# Patient Record
Sex: FEMALE
[De-identification: names, ages, dates, MRNs, and addresses within clinical notes are randomized per-mention and may not be internally consistent; named-entity substitution may affect disease eponyms.]

---

## 2018-05-08 ENCOUNTER — HOSPITAL ENCOUNTER (EMERGENCY)
Dept: HOSPITAL 97 - ER | Age: 23
Discharge: HOME | End: 2018-05-08
Payer: SELF-PAY

## 2018-05-08 VITALS — DIASTOLIC BLOOD PRESSURE: 64 MMHG | SYSTOLIC BLOOD PRESSURE: 105 MMHG

## 2018-05-08 VITALS — OXYGEN SATURATION: 99 %

## 2018-05-08 VITALS — TEMPERATURE: 98.5 F

## 2018-05-08 DIAGNOSIS — O03.4: Primary | ICD-10-CM

## 2018-05-08 DIAGNOSIS — O23.11: ICD-10-CM

## 2018-05-08 LAB
ALBUMIN SERPL BCP-MCNC: 4.3 G/DL (ref 3.2–5.5)
ALP SERPL-CCNC: 43 IU/L (ref 42–121)
ALT SERPL W P-5'-P-CCNC: 11 IU/L (ref 10–60)
AST SERPL W P-5'-P-CCNC: 14 IU/L (ref 10–42)
BUN BLD-MCNC: 12 MG/DL (ref 6–20)
GLUCOSE SERPLBLD-MCNC: 107 MG/DL (ref 65–120)
HCT VFR BLD CALC: 38.9 % (ref 36–45)
LIPASE SERPL-CCNC: 29 U/L (ref 22–51)
LYMPHOCYTES # SPEC AUTO: 1.5 K/UL (ref 0.7–4.9)
MCH RBC QN AUTO: 29.4 PG (ref 27–35)
MCV RBC: 87.7 FL (ref 80–100)
PMV BLD: 8.3 FL (ref 7.6–11.3)
POTASSIUM SERPL-SCNC: 3.5 MEQ/L (ref 3.6–5)
RBC # BLD: 4.43 M/UL (ref 3.86–4.86)
UA COMPLETE W REFLEX CULTURE PNL UR: (no result)

## 2018-05-08 PROCEDURE — 83690 ASSAY OF LIPASE: CPT

## 2018-05-08 PROCEDURE — 81003 URINALYSIS AUTO W/O SCOPE: CPT

## 2018-05-08 PROCEDURE — 81015 MICROSCOPIC EXAM OF URINE: CPT

## 2018-05-08 PROCEDURE — 80048 BASIC METABOLIC PNL TOTAL CA: CPT

## 2018-05-08 PROCEDURE — 87088 URINE BACTERIA CULTURE: CPT

## 2018-05-08 PROCEDURE — 85025 COMPLETE CBC W/AUTO DIFF WBC: CPT

## 2018-05-08 PROCEDURE — 99284 EMERGENCY DEPT VISIT MOD MDM: CPT

## 2018-05-08 PROCEDURE — 81025 URINE PREGNANCY TEST: CPT

## 2018-05-08 PROCEDURE — 36415 COLL VENOUS BLD VENIPUNCTURE: CPT

## 2018-05-08 PROCEDURE — 87086 URINE CULTURE/COLONY COUNT: CPT

## 2018-05-08 PROCEDURE — 87077 CULTURE AEROBIC IDENTIFY: CPT

## 2018-05-08 PROCEDURE — 80076 HEPATIC FUNCTION PANEL: CPT

## 2018-05-08 PROCEDURE — 87186 SC STD MICRODIL/AGAR DIL: CPT

## 2018-05-08 PROCEDURE — 76801 OB US < 14 WKS SINGLE FETUS: CPT

## 2018-05-08 NOTE — EDPHYS
Physician Documentation                                                                           

 [facility]                                                                

Name: Urvashi Witt                                                                                  

Age: 22 yrs                                                                                       

Sex: Female                                                                                       

: 1995                                                                                   

MRN: [id number]                                                                                   

Arrival Date: 2018                                                                          

Time: 14:33                                                                                       

Account#: [id number]                                                                            

Bed 13                                                                                            

Private MD:                                                                                       

ED Physician Frankie Nova                                                                       

HPI:                                                                                              

                                                                                             

19:18 This 22 yrs old  Female presents to ER via Ambulatory with complaints of        gs  

      Abdominal Pain.                                                                             

19:18 The patient presents with pelvic pain, that is located in/on the right lower quadrant   gs  

      and left lower quadrant. Onset: The symptoms/episode began/occurred gradually, 2 day(s)     

      ago, and became persistent. Modifying factors: The symptoms are alleviated by nothing,      

      the symptoms are aggravated by urinating. Associated signs and symptoms: Pertinent          

      negatives: fever, vaginal bleeding, vaginal discharge, vomiting. Severity of symptoms:      

      At their worst the symptoms were moderate, in the emergency department the symptoms are     

      unchanged. The patient has not experienced similar symptoms in the past.                    

                                                                                                  

OB/GYN:                                                                                           

14:38 LMP N/A - Irregular menses                                                              hj  

                                                                                                  

Historical:                                                                                       

- Allergies:                                                                                      

14:38 No Known Allergies;                                                                     hj  

- Home Meds:                                                                                      

14:38 None [Active];                                                                          hj  

- PMHx:                                                                                           

14:38 None;                                                                                   hj  

- PSHx:                                                                                           

14:38 None;                                                                                   hj  

                                                                                                  

- Immunization history:: Adult Immunizations up to date.                                          

- Social history:: Smoking status: Patient/guardian denies using tobacco.                         

                                                                                                  

                                                                                                  

ROS:                                                                                              

19:18 All other systems are negative.                                                         gs  

                                                                                                  

Exam:                                                                                             

19:18 Head/Face:  Normocephalic, atraumatic. Eyes:  Pupils equal round and reactive to light, gs  

      extra-ocular motions intact.  Lids and lashes normal.  Conjunctiva and sclera are           

      non-icteric and not injected.  Cornea within normal limits.  Periorbital areas with no      

      swelling, redness, or edema. ENT:  Nares patent. No nasal discharge, no septal              

      abnormalities noted.  Tympanic membranes are normal and external auditory canals are        

      clear.  Oropharynx with no redness, swelling, or masses, exudates, or evidence of           

      obstruction, uvula midline.  Mucous membranes moist. Neck:  Trachea midline, no             

      thyromegaly or masses palpated, and no cervical lymphadenopathy.  Supple, full range of     

      motion without nuchal rigidity, or vertebral point tenderness.  No Meningismus.             

      Chest/axilla:  Normal chest wall appearance and motion.  Nontender with no deformity.       

      No lesions are appreciated. Cardiovascular:  Regular rate and rhythm with a normal S1       

      and S2.  No gallops, murmurs, or rubs.  Normal PMI, no JVD.  No pulse deficits.             

      Respiratory:  Lungs have equal breath sounds bilaterally, clear to auscultation and         

      percussion.  No rales, rhonchi or wheezes noted.  No increased work of breathing, no        

      retractions or nasal flaring. Back:  No spinal tenderness.  No costovertebral               

      tenderness.  Full range of motion. Skin:  Warm, dry with normal turgor.  Normal color       

      with no rashes, no lesions, and no evidence of cellulitis. MS/ Extremity:  Pulses           

      equal, no cyanosis.  Neurovascular intact.  Full, normal range of motion. Neuro:  Awake     

      and alert, GCS 15, oriented to person, place, time, and situation.  Cranial nerves          

      II-XII grossly intact.  Motor strength 5/5 in all extremities.  Sensory grossly intact.     

       Cerebellar exam normal.  Normal gait.                                                      

19:18 Constitutional: The patient appears alert, awake.                                           

19:18 Abdomen/GI: Palpation: moderate abdominal tenderness, in the suprapubic area.               

                                                                                                  

Vital Signs:                                                                                      

14:38  / 78; Pulse 74; Resp 18; Temp 99.4(TE); Pulse Ox 100% on R/A; Weight 45.36 kg;   hj  

      Height 5 ft. 1 in. (154.94 cm); Pain 7/10;                                                  

16:13  / 82; Pulse 62; Resp 14; Temp 98.3; Pulse Ox 99% on R/A; Pain 6/10;              ch  

17:30  / 76; Pulse 65; Resp 18; Temp 98.5; Pulse Ox 99% on R/A; Pain 4/10;              ch  

18:25  / 64; Pulse 62; Resp 16; Pulse Ox 99% on R/A;                                    mh5 

14:38 Body Mass Index 18.89 (45.36 kg, 154.94 cm)                                             hj  

                                                                                                  

MDM:                                                                                              

15:28 Patient medically screened.                                                               

19:18 Differential diagnosis: ectopic pregnancy, urinary tract infection. Data reviewed:        

      vital signs, nurses notes. Counseling: I had a detailed discussion with the patient         

      and/or guardian regarding: the historical points, exam findings, and any diagnostic         

      results supporting the discharge/admit diagnosis, lab results, the need for outpatient      

      follow up. Response to treatment: the patient's symptoms have markedly improved after       

      treatment, and as a result, I will discharge patient.                                       

                                                                                                  

                                                                                             

15:32 Order name: Basic Metabolic Panel; Complete Time: 18:48                                   

                                                                                             

15:32 Order name: CBC with Diff; Complete Time: 18:48                                           

                                                                                             

15:32 Order name: Hepatic Function; Complete Time: 18:48                                        

                                                                                             

15:32 Order name: Lipase; Complete Time: 18:48                                                  

                                                                                             

15:32 Order name: Urine Microscopic Only; Complete Time: 18:48                                  

                                                                                             

16:25 Order name: Urine Pregnancy--Ancillary (enter results); Complete Time: 18:48              

                                                                                             

15:32 Order name: Urine Pregnancy Test (obtain specimen); Complete Time: 16:43                  

                                                                                             

15:32 Order name: Labs collected and sent; Complete Time: 18:50                                 

                                                                                             

16:25 Order name: Urine Dipstick--Ancillary (enter results); Complete Time: 18:48               

                                                                                             

16:36 Order name: Urine Culture                                                               [facility]

                                                                                             

16:42 Order name: 1St Trimest Single 1St Fetus; Complete Time: 18:48                          [facility]

                                                                                             

15:32 Order name: Urine Dipstick-Ancillary (obtain specimen); Complete Time: 16:42              

                                                                                                  

Administered Medications:                                                                         

16:13 Drug: CarafATE 1 grams Route: PO;                                                         

16:42 Follow up: Response: No adverse reaction                                                  

                                                                                                  

                                                                                                  

Disposition:                                                                                      

18 19:05 Discharged to Home. Impression: Cystitis, Incomplete spontaneous           

  without complication.                                                                           

- Condition is Stable.                                                                            

- Discharge Instructions: Incomplete Miscarriage, Urinary Tract Infection.                        

- Prescriptions for Macrobid 100 mg Oral Capsule - take 1 capsule by ORAL route every             

  12 hours for 5 days; 10 capsule.                                                                

- School release form, Work release form, Medication Reconciliation Form, Thank You               

  Letter, Antibiotic Education, Prescription Opioid Use form.                                     

- Follow up: Asha Overton MD; When: 2 - 3 days; Reason: Re-evaluation by your                   

  physician.                                                                                      

                                                                                                  

                                                                                                  

                                                                                                  

Signatures:                                                                                       

Dispatcher [employer]                           [facility]                                                 

Digna Gerardo RN RN                                                      

Leon Avila RN RN hj Starr, Gregory, MD MD                                                      

Cecy Crawford RN                      RN   [id number]                                                  

                                                                                                  

Corrections: (The following items were deleted from the chart)                                    

16:42 16:17 Pelvis Complete+[id number] ordered. EDMS                                          EDMS

19:37 19:05 2018 19:05 Discharged to Home. Impression: Cystitis; Incomplete spontaneous rk2 

       without complication. Condition is Stable. Forms are School release form, Work     

      release form, Medication Reconciliation Form, Thank You Letter, Antibiotic Education,       

      Prescription Opioid Use. Follow up: Asha Overton; When: 2 - 3 days; Reason:                 

      Re-evaluation by your physician. gs                                                         

                                                                                                  

**************************************************************************************************

## 2018-05-28 ENCOUNTER — HOSPITAL ENCOUNTER (EMERGENCY)
Dept: HOSPITAL 97 - ER | Age: 23
Discharge: HOME | End: 2018-05-28
Payer: SELF-PAY

## 2018-05-28 VITALS — TEMPERATURE: 99.6 F

## 2018-05-28 VITALS — OXYGEN SATURATION: 100 %

## 2018-05-28 VITALS — SYSTOLIC BLOOD PRESSURE: 111 MMHG | DIASTOLIC BLOOD PRESSURE: 68 MMHG

## 2018-05-28 DIAGNOSIS — Z91.038: ICD-10-CM

## 2018-05-28 DIAGNOSIS — L50.9: Primary | ICD-10-CM

## 2018-05-28 LAB
BUN BLD-MCNC: 12 MG/DL (ref 6–20)
GLUCOSE SERPLBLD-MCNC: 104 MG/DL (ref 65–120)
HCT VFR BLD CALC: 47.6 % (ref 36–45)
LYMPHOCYTES # SPEC AUTO: 2.6 K/UL (ref 0.7–4.9)
MCH RBC QN AUTO: 29.3 PG (ref 27–35)
MCV RBC: 89.2 FL (ref 80–100)
PMV BLD: 8.1 FL (ref 7.6–11.3)
POTASSIUM SERPL-SCNC: 3.7 MEQ/L (ref 3.6–5)
RBC # BLD: 5.34 M/UL (ref 3.86–4.86)

## 2018-05-28 PROCEDURE — 99291 CRITICAL CARE FIRST HOUR: CPT

## 2018-05-28 PROCEDURE — 85025 COMPLETE CBC W/AUTO DIFF WBC: CPT

## 2018-05-28 PROCEDURE — 96375 TX/PRO/DX INJ NEW DRUG ADDON: CPT

## 2018-05-28 PROCEDURE — 36415 COLL VENOUS BLD VENIPUNCTURE: CPT

## 2018-05-28 PROCEDURE — 96374 THER/PROPH/DIAG INJ IV PUSH: CPT

## 2018-05-28 PROCEDURE — 80048 BASIC METABOLIC PNL TOTAL CA: CPT

## 2018-05-28 NOTE — ER
Nurse's Notes                                                                                     

 [facility]                                                                

Name: Urvashi Witt                                                                                  

Age: 22 yrs                                                                                       

Sex: Female                                                                                       

: 1995                                                                                   

MRN: [id number]                                                                                   

Arrival Date: 2018                                                                          

Time: 15:08                                                                                       

Account#: [id number]                                                                            

Bed 4                                                                                             

Private MD: Out, [facility]                                                                    

Diagnosis: Insect allergy status;Urticaria                                                        

                                                                                                  

Presentation:                                                                                     

                                                                                             

15:13 Presenting complaint: Patient states: " I was at the pool and I got bit by 2 fire ants. ph  

      Now I'm itching all over and I feel like my throat is closing." Pt tearful and anxious      

      in triage, taken to trauma bed 4. Transition of care: patient was not received from         

      another setting of care. Onset: The symptoms/episode began/occurred acutely.                

      Anaphylaxis evaluation, the patient reports or I have noted the following symptoms          

      which indicate a significant risk of anaphylaxis: lightheadedness shortness of breath       

      tachypnea urticaria. Onset of symptoms was May 28, 2018. Risk Assessment: Do you want       

      to hurt yourself or someone else? Patient reports no desire to harm self or others.         

      Initial Sepsis Screen: Does the patient meet any 2 criteria? No. Patient's initial          

      sepsis screen is negative. Does the patient have a suspected source of infection? No.       

      Patient's initial sepsis screen is negative. Care prior to arrival: None.                   

15:13 Method Of Arrival: Ambulatory                                                           ph  

15:13 Acuity: YAMILET 1                                                                           ph  

                                                                                                  

Historical:                                                                                       

- Allergies:                                                                                      

15:15 No Known Allergies;                                                                     ph  

- Home Meds:                                                                                      

15:15 None [Active];                                                                          ph  

- PMHx:                                                                                           

15:15 None;                                                                                   ph  

- PSHx:                                                                                           

15:15 None;                                                                                   ph  

                                                                                                  

- Immunization history:: Adult Immunizations up to date.                                          

- Social history:: Smoking status: Patient/guardian denies using tobacco.                         

- Ebola Screening: : Patient negative for fever greater than or equal to 101.5 degrees            

  Fahrenheit, and additional compatible Ebola Virus Disease symptoms Patient denies               

  exposure to infectious person Patient denies travel to an Ebola-affected area in the            

  21 days before illness onset No symptoms or risks identified at this time.                      

                                                                                                  

                                                                                                  

Screening:                                                                                        

15:15 Abuse screen: Denies threats or abuse. Denies injuries from another. Nutritional        sg  

      screening: No deficits noted. Tuberculosis screening: No symptoms or risk factors           

      identified. Never had TB. Fall Risk None identified.                                        

                                                                                                  

Assessment:                                                                                       

15:15 General: Appears distressed, well groomed, well developed, well nourished, Behavior is  sg  

      calm, cooperative, appropriate for age. Pain: Denies pain. Neuro: Level of                  

      Consciousness is awake, alert, obeys commands, Oriented to person, place, time,             

      situation,  are equal bilaterally Moves all extremities. Full function Speech is       

      normal, Facial symmetry appears normal. Cardiovascular: Heart tones S1 S2 present           

      Capillary refill is brisk in bilateral fingers Patient's skin is warm and dry. Chest        

      pain is denied. Respiratory: Airway is patent Respiratory effort is even, labored,          

      Respiratory pattern is symmetrical, tachypnea Breath sounds with wheezes in right           

      posterior upper lobe and right posterior middle lobe. GI: Abdomen is flat,                  

      non-distended. : No signs and/or symptoms were reported regarding the genitourinary       

      system. EENT: Nares are clear bilaterally Oral mucosa is moist. Throat is reddened          

      Reports difficulty swallowing has hoarseness upon speaking. Derm: Skin is intact, is        

      healthy with good turgor, Skin is dry, Skin is Skin temperature is warm Rash noted that     

      is macular, itchy, red, urticaria. Musculoskeletal: No signs and/or symptoms reported       

      regarding the musculoskeletal system.                                                       

15:31 Reassessment: Patient is alert, oriented x 3, equal unlabored respirations, skin        sg  

      warm/dry/pink. Patient states feeling better. Patient states symptoms have improved.        

      Respiratory: Airway is patent Respiratory effort is even, unlabored, Respiratory            

      pattern is regular, symmetrical, Breath sounds with wheezes in right posterior upper        

      lobe and right posterior middle lobe the patient reports symptoms have resolved.            

                                                                                                  

Vital Signs:                                                                                      

15:15  / 101; Pulse 134; Resp 28; Temp 99.6(TE); Pulse Ox 99% on R/A; Weight 44.91 kg;  ph  

15:32  / 90; Pulse 79 MON; Resp 17 S; Pulse Ox 100% on 3 lpm NC;                        sg  

16:00  / 68; Pulse 59 MON; Resp 18; Pulse Ox 100% on 3 lpm NC; Pain 0/10;               sg  

                                                                                                  

[place] Coma Score:                                                                               

15:32 Eye Response: spontaneous(4). Verbal Response: oriented(5). Motor Response: obeys       sg  

      commands(6). Total: 15.                                                                     

                                                                                                  

ED Course:                                                                                        

15:08 Patient arrived in ED.                                                                  sb2 

15:08 Out, [facility] is Private Physician.                                                      sb2 

15:13 Lamont Luu MD is Attending Physician.                                             [facility] 

15:13 Keron Swanson NP is PHCP.                                                           pm1 

15:13 Inserted saline lock: 20 gauge in right antecubital area, using aseptic technique.        

      Blood collected. Oxygen administration via nasal cannula \T\ 2L/min.                          

15:15 Triage completed.                                                                       ph  

15:15 Patient has correct armband on for positive identification. Bed in low position. Call   sg  

      light in reach. Side rails up X2. Cardiac monitor on. Pulse ox on. NIBP on. Warm            

      blanket given. Head of bed elevated.                                                        

15:16 Arm band placed on.                                                                     ph  

15:19 Lalo Wilson, RN is Primary Nurse.                                                       sg  

17:40 No provider procedures requiring assistance completed. IV discontinued, intact,         sg  

      bleeding controlled, No redness/swelling at site. Pressure dressing applied.                

                                                                                                  

Administered Medications:                                                                         

15:18 Drug: Benadryl 50 mg Route: IVP; Site: right antecubital;                               sg  

15:20 Drug: NS 0.9% 1000 ml Route: IV; Rate: 1 bolus; Site: right antecubital;                sg  

15:20 Drug: SOLU-Medrol 125 mg Route: IVP; Site: right antecubital;                           sg  

15:20 Drug: Pepcid 40 mg Route: IVP; Site: right antecubital;                                 sg  

15:45 Drug: predniSONE 40 mg Route: PO;                                                       sg  

                                                                                                  

                                                                                                  

Outcome:                                                                                          

16:07 Discharge ordered by MD.                                                                [facility] 

17:40 Discharged to home ambulatory, with family.                                             sg  

17:40 Condition: good                                                                             

17:40 Discharge instructions given to patient, Instructed on discharge instructions, follow       

      up and referral plans. medication usage, safety practices, Demonstrated understanding       

      of instructions, follow-up care, medications, Prescriptions given X 4.                      

17:46 Patient left the ED.                                                                    sg  

                                                                                                  

Signatures:                                                                                       

Lalo Wilson RN RN                                                      

Lamont Luu MD MD cha Smirch, Shelby, RN RN                                                      

Inge Granda RN RN                                                      

Keron Swanson NP                    NP   pm1                                                  

Jennifer Galvan                               sb2                                                  

                                                                                                  

Corrections: (The following items were deleted from the chart)                                    

15:16 15:13 Acuity: YAMILET 2 ph                                                                  ph  

                                                                                                  

**************************************************************************************************

## 2018-05-28 NOTE — EDPHYS
Physician Documentation                                                                           

 [facility]                                                                

Name: Urvashi Witt                                                                                  

Age: 22 yrs                                                                                       

Sex: Female                                                                                       

: 1995                                                                                   

MRN: [id number]                                                                                   

Arrival Date: 2018                                                                          

Time: 15:08                                                                                       

Account#: [id number]                                                                            

Bed 4                                                                                             

Private MD: Out, [facility]                                                                    

ED Physician Lamont Luu                                                                      

HPI:                                                                                              

                                                                                             

15:21 This 22 yrs old  Female presents to ER via Ambulatory with complaints of        terrence 

      Allergic Reaction.                                                                          

15:21 The patient presents with difficulty swallowing, hoarse voice, itching, rash, redness   terrence 

      of skin, swelling of the lips. Onset: The symptoms/episode began/occurred just prior to     

      arrival. Associated signs and symptoms: The patient has no apparent associated signs or     

      symptoms. Possible causes: ants. At home the patient or guardian has treated the            

      symptoms with nothing. Severity of symptoms: At their worst the symptoms were mild          

      moderate in the emergency department the symptoms are unchanged. The patient has not        

      experienced similar symptoms in the past.                                                   

                                                                                                  

Historical:                                                                                       

- Allergies:                                                                                      

15:15 No Known Allergies;                                                                     ph  

- Home Meds:                                                                                      

15:15 None [Active];                                                                          ph  

- PMHx:                                                                                           

15:15 None;                                                                                   ph  

- PSHx:                                                                                           

15:15 None;                                                                                   ph  

                                                                                                  

- Immunization history:: Adult Immunizations up to date.                                          

- Social history:: Smoking status: Patient/guardian denies using tobacco.                         

- Ebola Screening: : Patient negative for fever greater than or equal to 101.5 degrees            

  Fahrenheit, and additional compatible Ebola Virus Disease symptoms Patient denies               

  exposure to infectious person Patient denies travel to an Ebola-affected area in the            

  21 days before illness onset No symptoms or risks identified at this time.                      

                                                                                                  

                                                                                                  

ROS:                                                                                              

15:22 Constitutional: Negative for fever, chills, and weight loss, Eyes: Negative for injury, terrence 

      pain, redness, and discharge, Neck: Negative for injury, pain, and swelling,                

      Cardiovascular: Negative for chest pain, palpitations, and edema, Abdomen/GI: Negative      

      for abdominal pain, nausea, vomiting, diarrhea, and constipation, Back: Negative for        

      injury and pain, : Negative for injury, bleeding, discharge, and swelling,                

      MS/Extremity: Negative for injury and deformity, Neuro: Negative for headache,              

      weakness, numbness, tingling, and seizure, Psych: Negative for depression, anxiety,         

      suicide ideation, homicidal ideation, and hallucinations, Allergy/Immunology: Negative      

      for hives, rash, and allergies, Endocrine: Negative for neck swelling, polydipsia,          

      polyuria, polyphagia, and marked weight changes, Hematologic/Lymphatic: Negative for        

      swollen nodes, abnormal bleeding, and unusual bruising.                                     

15:22 ENT: Positive for difficulty swallowing.                                                    

15:22 Respiratory: Positive for shortness of breath.                                              

15:22 Skin: Positive for erythema, rash, swelling, diffusely.                                     

15:22 Neuro: Negative for altered mental status.                                                  

                                                                                                  

Exam:                                                                                             

15:22 Constitutional:  This is a well developed, well nourished patient who is awake, alert,  terrence 

      and in no acute distress. Head/Face:  Normocephalic, atraumatic. Eyes:  Pupils equal        

      round and reactive to light, extra-ocular motions intact.  Lids and lashes normal.          

      Conjunctiva and sclera are non-icteric and not injected.  Cornea within normal limits.      

      Periorbital areas with no swelling, redness, or edema. Neck:  Trachea midline, no           

      thyromegaly or masses palpated, and no cervical lymphadenopathy.  Supple, full range of     

      motion without nuchal rigidity, or vertebral point tenderness.  No Meningismus.             

      Chest/axilla:  Normal chest wall appearance and motion.  Nontender with no deformity.       

      No lesions are appreciated. Respiratory:  Lungs have equal breath sounds bilaterally,       

      clear to auscultation and percussion.  No rales, rhonchi or wheezes noted.  No              

      increased work of breathing, no retractions or nasal flaring. Abdomen/GI:  Soft,            

      non-tender, with normal bowel sounds.  No distension or tympany.  No guarding or            

      rebound.  No evidence of tenderness throughout. Back:  No spinal tenderness.  No            

      costovertebral tenderness.  Full range of motion. MS/ Extremity:  Pulses equal, no          

      cyanosis.  Neurovascular intact.  Full, normal range of motion. Neuro:  Awake and           

      alert, GCS 15, oriented to person, place, time, and situation.  Cranial nerves II-XII       

      grossly intact.  Motor strength 5/5 in all extremities.  Sensory grossly intact.            

      Cerebellar exam normal.  Normal gait.                                                       

15:22 ENT: Posterior pharynx: Airway: no evidence of obstruction, Tonsils: are normal in          

      appearance, Uvula: normal, midline, erythema, swelling, that is mild, erythema, that is     

      mild, exudate, peritonsillar mass, is not appreciated.                                      

                                                                                                  

Vital Signs:                                                                                      

15:15  / 101; Pulse 134; Resp 28; Temp 99.6(TE); Pulse Ox 99% on R/A; Weight 44.91 kg;  ph  

15:32  / 90; Pulse 79 MON; Resp 17 S; Pulse Ox 100% on 3 lpm NC;                        sg  

16:00  / 68; Pulse 59 MON; Resp 18; Pulse Ox 100% on 3 lpm NC; Pain 0/10;               sg  

                                                                                                  

[place] Coma Score:                                                                               

15:32 Eye Response: spontaneous(4). Verbal Response: oriented(5). Motor Response: obeys       sg  

      commands(6). Total: 15.                                                                     

                                                                                                  

MDM:                                                                                              

15:13 Patient medically screened.                                                             [facility] 

16:06 Data reviewed: vital signs, nurses notes, lab test result(s), CBC, electrolytes.        [facility] 

                                                                                                  

                                                                                             

15:15 Order name: CBC with Diff; Complete Time: 16:06                                         [facility] 

                                                                                             

15:15 Order name: Chem 7; Complete Time: 16:06                                                [facility] 

                                                                                                  

Administered Medications:                                                                         

15:18 Drug: Benadryl 50 mg Route: IVP; Site: right antecubital;                               sg  

15:20 Drug: NS 0.9% 1000 ml Route: IV; Rate: 1 bolus; Site: right antecubital;                sg  

15:20 Drug: SOLU-Medrol 125 mg Route: IVP; Site: right antecubital;                           sg  

15:20 Drug: Pepcid 40 mg Route: IVP; Site: right antecubital;                                 sg  

15:45 Drug: predniSONE 40 mg Route: PO;                                                       sg  

                                                                                                  

                                                                                                  

Disposition:                                                                                      

18 16:07 Discharged to Home. Impression: Insect allergy status, Urticaria.                  

- Condition is Stable.                                                                            

- Discharge Instructions: Allergies, Hives, Hives, Easy-to-Read.                                  

- Prescriptions for Benadryl 25 mg Oral Capsule - take 1 capsule by ORAL route every 6            

  hours As needed; 30 tablet. Pepcid 20 mg Oral Tablet - take 1 tablet by ORAL route              

  every 12 hours for 10 days; 20 tablet. Prednisone 20 mg Oral Tablet - take 2 tablet             

  by ORAL route once daily for 5 days; 10 tablet. EpiPen 0.3 mg Injection auto-                   

  injector - inject 1 pen by INTRAMUSCULAR route one time Inject into the outer portion           

  of the thigh, through clothing if necessary. Indicated in the emergency treatment of            

  allergic reactions; 1 Cartridge.                                                                

- Medication Reconciliation Form, Thank You Letter, Antibiotic Education, Prescription            

  Opioid Use form.                                                                                

- Follow up: Private Physician; When: 2 - 3 days; Reason: Recheck today's complaints,             

  Continuance of care, Re-evaluation by your physician.                                           

- Problem is new.                                                                                 

- Symptoms have improved.                                                                         

                                                                                                  

                                                                                                  

                                                                                                  

Signatures:                                                                                       

Dispatcher [employer]                           EDMS                                                 

Lalo Wilson RN                         RN   sg                                                   

Lamont Luu MD MD cha Hall, Patricia, RN                      RN   ph                                                   

                                                                                                  

Corrections: (The following items were deleted from the chart)                                    

17:46 16:07 2018 16:07 Discharged to Home. Impression: Insect allergy status;           sg  

      Urticaria. Condition is Stable. Discharge Instructions: Allergies, Hives, Hives,            

      Easy-to-Read. Prescriptions for Benadryl 25 mg Oral Capsule - take 1 capsule by ORAL        

      route every 6 hours As needed; 30 tablet, Pepcid 20 mg Oral Tablet - take 1 tablet by       

      ORAL route every 12 hours for 10 days; 20 tablet, Prednisone 20 mg Oral Tablet - take 2     

      tablet by ORAL route once daily for 5 days; 10 tablet, EpiPen 0.3 mg Injection              

      auto-injector - inject 1 pen by INTRAMUSCULAR route one time Inject into the outer          

      portion of the thigh, through clothing if necessary. Indicated in the emergency             

      treatment of allergic reactions; 1 Cartridge. and Forms are Medication Reconciliation       

      Form, Thank You Letter, Antibiotic Education, Prescription Opioid Use. Follow up:           

      Private Physician; When: 2 - 3 days; Reason: Recheck today's complaints, Continuance of     

      care, Re-evaluation by your physician. Problem is new. Symptoms have improved. terrence          

                                                                                                  

**************************************************************************************************

## 2018-11-25 ENCOUNTER — HOSPITAL ENCOUNTER (EMERGENCY)
Dept: HOSPITAL 97 - ER | Age: 23
Discharge: HOME | End: 2018-11-25
Payer: SELF-PAY

## 2018-11-25 VITALS — TEMPERATURE: 98.7 F

## 2018-11-25 VITALS — DIASTOLIC BLOOD PRESSURE: 59 MMHG | OXYGEN SATURATION: 100 % | SYSTOLIC BLOOD PRESSURE: 96 MMHG

## 2018-11-25 DIAGNOSIS — M54.5: ICD-10-CM

## 2018-11-25 DIAGNOSIS — R10.30: Primary | ICD-10-CM

## 2018-11-25 LAB
ALBUMIN SERPL BCP-MCNC: 4.3 G/DL (ref 3.4–5)
ALP SERPL-CCNC: 88 U/L (ref 45–117)
ALT SERPL W P-5'-P-CCNC: 19 U/L (ref 12–78)
AST SERPL W P-5'-P-CCNC: 14 U/L (ref 15–37)
BLD SMEAR INTERP: (no result)
BUN BLD-MCNC: 18 MG/DL (ref 7–18)
GLUCOSE SERPLBLD-MCNC: 96 MG/DL (ref 74–106)
HCT VFR BLD CALC: 41.6 % (ref 36–45)
LIPASE SERPL-CCNC: 196 U/L (ref 73–393)
LYMPHOCYTES # SPEC AUTO: 0.6 K/UL (ref 0.7–4.9)
MCH RBC QN AUTO: 30.2 PG (ref 27–35)
MCV RBC: 88.1 FL (ref 80–100)
MORPHOLOGY BLD-IMP: (no result)
PMV BLD: 8.8 FL (ref 7.6–11.3)
POTASSIUM SERPL-SCNC: 3.7 MMOL/L (ref 3.5–5.1)
RBC # BLD: 4.73 M/UL (ref 3.86–4.86)

## 2018-11-25 PROCEDURE — 74177 CT ABD & PELVIS W/CONTRAST: CPT

## 2018-11-25 PROCEDURE — 96375 TX/PRO/DX INJ NEW DRUG ADDON: CPT

## 2018-11-25 PROCEDURE — 80048 BASIC METABOLIC PNL TOTAL CA: CPT

## 2018-11-25 PROCEDURE — 83690 ASSAY OF LIPASE: CPT

## 2018-11-25 PROCEDURE — 96361 HYDRATE IV INFUSION ADD-ON: CPT

## 2018-11-25 PROCEDURE — 81003 URINALYSIS AUTO W/O SCOPE: CPT

## 2018-11-25 PROCEDURE — 36415 COLL VENOUS BLD VENIPUNCTURE: CPT

## 2018-11-25 PROCEDURE — 99284 EMERGENCY DEPT VISIT MOD MDM: CPT

## 2018-11-25 PROCEDURE — 81025 URINE PREGNANCY TEST: CPT

## 2018-11-25 PROCEDURE — 80076 HEPATIC FUNCTION PANEL: CPT

## 2018-11-25 PROCEDURE — 85025 COMPLETE CBC W/AUTO DIFF WBC: CPT

## 2018-11-25 PROCEDURE — 96374 THER/PROPH/DIAG INJ IV PUSH: CPT

## 2018-11-25 NOTE — ER
Nurse's Notes                                                                                     

 [facility]                                                                

Name: Urvashi Witt                                                                                  

Age: 23 yrs                                                                                       

Sex: Female                                                                                       

: 1995                                                                                   

MRN: [id number]                                                                                   

Arrival Date: 2018                                                                          

Time: 11:29                                                                                       

Account#: [id number]                                                                            

Bed 4                                                                                             

Private MD:                                                                                       

Diagnosis: Nausea and vomiting;Lower abdominal pain, unspecified;Low back pain                    

                                                                                                  

Presentation:                                                                                     

                                                                                             

11:34 Presenting complaint: Patient states: "I have bad pain in my back. Last time I had them aj1 

      like this it was because of my kidneys. I've been feeling nauseous all morning."            

      Reports vomiting once today. Denies fever. Denies dysuria, urinary frequency.               

      Transition of care: patient was not received from another setting of care. Onset of         

      symptoms was 2018. Risk Assessment: Do you want to hurt yourself or            

      someone else? Patient reports no desire to harm self or others. Initial Sepsis Screen:      

      Does the patient meet any 2 criteria? No. Patient's initial sepsis screen is negative.      

      Does the patient have a suspected source of infection? No. Patient's initial sepsis         

      screen is negative. Care prior to arrival: None.                                            

11:34 Method Of Arrival: Ambulatory                                                           aj1 

11:34 Acuity: YAMILET 3                                                                           aj1 

                                                                                                  

Triage Assessment:                                                                                

11:37 General: Appears in no apparent distress. comfortable, Behavior is calm, cooperative,   aj1 

      appropriate for age. Pain: Complains of pain in back Pain currently is 10 out of 10 on      

      a pain scale. Neuro: Level of Consciousness is awake, alert, obeys commands.                

      Cardiovascular: Patient's skin is warm and dry. Respiratory: Airway is patent               

      Respiratory effort is even, unlabored, Respiratory pattern is regular, symmetrical. GI:     

      Reports nausea, vomiting.                                                                   

                                                                                                  

OB/GYN:                                                                                           

11:37 LMP 2018                                                                          aj1 

                                                                                                  

Historical:                                                                                       

- Allergies:                                                                                      

11:37 No Known Allergies;                                                                     aj1 

- Home Meds:                                                                                      

11:37 None [Active];                                                                          aj1 

- PMHx:                                                                                           

11:37 None;                                                                                   aj1 

- PSHx:                                                                                           

11:37 None;                                                                                   aj1 

                                                                                                  

- Immunization history:: Flu vaccine is not up to date.                                           

- Social history:: Smoking status: Patient/guardian denies using tobacco.                         

- Ebola Screening: : Patient denies travel to an Ebola-affected area in the 21 days               

  before illness onset.                                                                           

                                                                                                  

                                                                                                  

Screenin:05 Abuse screen: Denies threats or abuse. Denies injuries from another. Nutritional        sv  

      screening: No deficits noted. Tuberculosis screening: No symptoms or risk factors           

      identified. Fall Risk None identified.                                                      

                                                                                                  

Assessment:                                                                                       

12:00 General: Appears in no apparent distress. uncomfortable, well developed, Behavior is    sv  

      calm, cooperative, appropriate for age. Pain: Complains of pain in back and abdomen         

      Pain currently is 10 out of 10 on a pain scale. Pain began this morning Is                  

      intermittent. Neuro: Level of Consciousness is awake, alert, obeys commands, Oriented       

      to person, place, time, situation, Moves all extremities. Full function Gait is steady,     

      Speech is normal. Respiratory: Respiratory effort is even, unlabored, Respiratory           

      pattern is regular, symmetrical. GI: Abdomen is flat, Abdomen is tender to palpation X      

      4 quads. Reports nausea, vomiting. Derm: Skin is normal. Musculoskeletal: Range of          

      motion: intact in all extremities.                                                          

12:40 Reassessment: Patient appears in no apparent distress at this time. Patient and/or      sv  

      family updated on plan of care and expected duration. Pain level reassessed. Patient is     

      alert, oriented x 3, equal unlabored respirations, skin warm/dry/pink. GI: Reports          

      nausea.                                                                                     

13:21 Reassessment: Patient appears in no apparent distress at this time. Reassessment:       sv  

      Patient appears in no apparent distress at this time. Patient and/or family updated on      

      plan of care and expected duration. Pain level reassessed. Patient is alert, oriented x     

      3, equal unlabored respirations, skin warm/dry/pink. GI: Reports nausea.                    

15:02 Reassessment: Patient appears in no apparent distress at this time. Patient and/or      sv  

      family updated on plan of care and expected duration. Pain level reassessed. Patient is     

      alert, oriented x 3, equal unlabored respirations, skin warm/dry/pink.                      

                                                                                                  

Vital Signs:                                                                                      

11:37  / 62; Pulse 87; Resp 18; Temp 98.7; Pulse Ox 98% on R/A; Height 5 ft. 1 in.      aj1 

      (154.94 cm) (R); Pain 10/10;                                                                

13:40 BP 96 / 59; Pulse 67; Resp 14; Pulse Ox 100% on R/A;                                    mh5 

                                                                                                  

ED Course:                                                                                        

11:29 Patient arrived in ED.                                                                  mr  

11:37 Triage completed.                                                                       aj1 

11:37 Arm band placed on Patient placed in an exam room.                                      aj1 

11:40 Lamont Max PA is PHCP.                                                                cp  

11:40 Lamont Luu MD is Attending Physician.                                             cp  

11:42 Nava Garcia RN is Primary Nurse.                                                  sv  

12:05 Patient has correct armband on for positive identification. Placed in gown. Bed in low  sv  

      position. Call light in reach. Adult w/ patient. Door closed. Warm blanket given. Head      

      of bed elevated.                                                                            

12:05 Initial lab(s) drawn, by me, sent to lab. Inserted saline lock: 20 gauge in right       sv  

      antecubital area, using aseptic technique. Blood collected. Flushed right antecubital       

      with 5 ml normal saline.                                                                    

14:04 CT completed. Patient tolerated procedure well. Patient moved to CT via wheelchair.       

      Patient moved back from CT.                                                                 

14:06 CT Abd/Pelvis - W/Contrast In Process Unspecified.                                      EDMS

15:02 No provider procedures requiring assistance completed. IV discontinued, intact,         sv  

      bleeding controlled, No redness/swelling at site. Pressure dressing applied.                

                                                                                                  

Administered Medications:                                                                         

12:10 Drug: Zofran 4 mg Route: IVP; Site: right antecubital;                                  sv  

12:35 Follow up: Response: No adverse reaction; No change in condition                        sv  

12:10 Drug: NS 0.9% 1000 ml Route: IV; Rate: 1 bolus; Site: right antecubital;                sv  

13:20 Follow up: Response: No adverse reaction; IV Status: Completed infusion; IV Intake:     sv  

      1000ml                                                                                      

12:12 Drug: TORadol 30 mg Route: IVP; Site: right antecubital;                                sv  

12:30 Follow up: Response: No adverse reaction                                                sv  

12:40 Drug: Zofran 4 mg Route: IVP; Site: right antecubital;                                  sv  

13:15 Follow up: Response: No adverse reaction; No change in condition                        sv  

13:19 Drug: Phenergan 12.5 mg Route: IVP; Site: right antecubital;                            sv  

13:30 Follow up: Response: No adverse reaction                                                sv  

                                                                                                  

                                                                                                  

Intake:                                                                                           

13:10 PO: 500ml (Contrast); Total: 500ml.                                                     sv  

13:20 IV: 1000ml; Total: 1500ml.                                                              sv  

                                                                                                  

Outcome:                                                                                          

14:52 Discharge ordered by MD.                                                                cp  

15:03 Discharged to home ambulatory, with friend.                                             sv  

15:03 Condition: stable                                                                           

15:03 Discharge instructions given to patient, Instructed on discharge instructions, follow       

      up and referral plans. medication usage, Demonstrated understanding of instructions,        

      follow-up care, medications, Prescriptions given X 3.                                       

15:03 Patient left the ED.                                                                    sv  

                                                                                                  

Signatures:                                                                                       

Dispatcher [employer]                           EDAdriana Graves RN                     RN   aj1                                                  

Nava Garcia RN RN   sv                                                   

Adalgisa Ruvalcaba                                 mr                                                   

Domingo, Lamont Larson PA PA cp [place], [place] [id number]                                                  

                                                                                                  

**************************************************************************************************

## 2018-11-25 NOTE — EDPHYS
Physician Documentation                                                                           

 [facility]                                                                

Name: Urvashi Witt                                                                                  

Age: 23 yrs                                                                                       

Sex: Female                                                                                       

: 1995                                                                                   

MRN: [id number]                                                                                   

Arrival Date: 2018                                                                          

Time: 11:29                                                                                       

Account#: [id number]                                                                            

Bed 4                                                                                             

Private MD:                                                                                       

ED Physician Lamont Luu                                                                      

HPI:                                                                                              

                                                                                             

11:57 This 23 yrs old  Female presents to ER via Ambulatory with complaints of Pain   cp  

      All Over, Vomiting.                                                                         

11:57 The patient presents with pain that is acute, with no known mechanism of injury, and    cp  

      tenderness. The symptoms are located in the low back. The pain does not radiate. The        

      problem was sustained from unknown cause. Onset: The symptoms/episode began/occurred        

      last night.                                                                                 

11:58 The patient presents with abdominal pain in the lower abdomen. Onset: The               cp  

      symptoms/episode began/occurred this morning. Associated signs and symptoms: Pertinent      

      negatives: constipation, diarrhea, fever, vomiting.                                         

                                                                                                  

OB/GYN:                                                                                           

11:37 LMP 2018                                                                          aj1 

                                                                                                  

Historical:                                                                                       

- Allergies:                                                                                      

11:37 No Known Allergies;                                                                     aj1 

- Home Meds:                                                                                      

11:37 None [Active];                                                                          aj1 

- PMHx:                                                                                           

11:37 None;                                                                                   aj1 

- PSHx:                                                                                           

11:37 None;                                                                                   aj1 

                                                                                                  

- Immunization history:: Flu vaccine is not up to date.                                           

- Social history:: Smoking status: Patient/guardian denies using tobacco.                         

- Ebola Screening: : Patient denies travel to an Ebola-affected area in the 21 days               

  before illness onset.                                                                           

                                                                                                  

                                                                                                  

ROS:                                                                                              

12:00 Eyes: Negative for injury, pain, redness, and discharge.                                cp  

12:00 Constitutional: Negative for body aches, chills, fever, poor PO intake.                     

12:00 ENT: Negative for drainage from ear(s), ear pain, sore throat, difficulty swallowing,       

      difficulty handling secretions.                                                             

12:00 Cardiovascular: Negative for chest pain, edema, palpitations.                               

12:00 Respiratory: Negative for cough, shortness of breath, wheezing.                             

12:00 Abdomen/GI: Positive for abdominal pain, of the right lower quadrant and left lower         

      quadrant, Negative for vomiting, diarrhea, constipation.                                    

12:00 Back: Positive for pain at rest, pain with movement, of the low back area.              cp  

12:00 : Negative for vaginal bleeding, vaginal discharge.                                       

12:00 Skin: Negative for cellulitis, rash.                                                        

12:00 Neuro: Negative for altered mental status, headache, weakness.                              

12:00 All other systems are negative.                                                             

                                                                                                  

Exam:                                                                                             

12:07 Constitutional: The patient appears in no acute distress, alert, awake, non-toxic, well cp  

      developed, well nourished.                                                                  

12:07 Head/Face:  Normocephalic, atraumatic. Eyes:  Pupils equal round and reactive to light, cp  

      extra-ocular motions intact.  Lids and lashes normal.  Conjunctiva and sclera are           

      non-icteric and not injected.  Cornea within normal limits.  Periorbital areas with no      

      swelling, redness, or edema. ENT:  Nares patent. No nasal discharge, no septal              

      abnormalities noted.  Tympanic membranes are normal and external auditory canals are        

      clear.  Oropharynx with no redness, swelling, or masses, exudates, or evidence of           

      obstruction, uvula midline.  Mucous membranes moist. Neck:  Trachea midline, no             

      thyromegaly or masses palpated, and no cervical lymphadenopathy.  Supple, full range of     

      motion without nuchal rigidity, or vertebral point tenderness.  No Meningismus.             

      Chest/axilla:  Normal chest wall appearance and motion.  Nontender with no deformity.       

      No lesions are appreciated.                                                                 

12:07 Cardiovascular: Rate: normal, Rhythm: regular, Heart sounds: murmur, not appreciated,       

      Edema: is not appreciated, JVD: is not appreciated.                                         

12:07 Respiratory: the patient does not display signs of respiratory distress,  Respirations:     

      normal, no use of accessory muscles, no retractions, no splinting, no tachypnea,            

      labored breathing, is not present, Breath sounds: are clear throughout, no decreased        

      breath sounds, no stridor, no wheezing.                                                     

12:07 Abdomen/GI: Inspection: abdomen appears normal, Bowel sounds: active, all quadrants,        

      Palpation: soft, in all quadrants, moderate abdominal tenderness, in the right lower        

      quadrant and left lower quadrant, rebound tenderness, is not appreciated, involuntary       

      guarding, is not appreciated.                                                               

12:07 Back: pain, that is moderate, of the  low back area and mid back area, ROM is painful,      

      Straight leg raises: of both lower extremities does not illicit pain.                       

12:07 Musculoskeletal/extremity: Extremities: all appear grossly normal, with no appreciated      

      pain with palpation.                                                                        

12:07 Skin: cellulitis, is not appreciated, no rash present.                                      

12:07 Neuro: Orientation: to person, place \T\ time. Mentation: is normal, Cerebellar function:   

      is grossly normal, Motor: is normal, Sensation: is normal.                                  

14:50 : Pelvic Exam: The exam is refused by the patient/guardian.  The risks and            cp  

      consequences are understood by the patient.                                                 

                                                                                                  

Vital Signs:                                                                                      

11:37  / 62; Pulse 87; Resp 18; Temp 98.7; Pulse Ox 98% on R/A; Height 5 ft. 1 in.      aj1 

      (154.94 cm) (R); Pain 10/10;                                                                

13:40 BP 96 / 59; Pulse 67; Resp 14; Pulse Ox 100% on R/A;                                    mh5 

                                                                                                  

MDM:                                                                                              

11:41 Patient medically screened.                                                             cp  

12:00 Differential diagnosis: sciatica, UTI, appendicitis, cholecystitis, Cholelithiasis,     cp  

      gastritis, Ovarian Torsion, Pyelonephritis, PID.                                            

14:50 Data reviewed: vital signs, nurses notes, lab test result(s), radiologic studies, CT    cp  

      scan. Refusal of service: The patient/guardian displays adequate decision making            

      capability and despite a detailed discussion of alternatives, benefits, risks, and          

      consequences refuses: pelvic exam.                                                          

14:51 Counseling: I had a detailed discussion with the patient and/or guardian regarding: the cp  

      historical points, exam findings, and any diagnostic results supporting the                 

      discharge/admit diagnosis, lab results, radiology results, to return to the emergency       

      department if symptoms worsen or persist or if there are any questions or concerns that     

      arise at home.                                                                              

14:51 Response to treatment: the patient's symptoms have markedly improved after treatment,   cp  

      VSS. Pain and nausea markedly improved. Will discharge to home for continued monitoring.    

                                                                                                  

                                                                                             

11:53 Order name: Urine Dipstick--Ancillary (enter results); Complete Time: 12:45             sv  

                                                                                             

11:53 Order name: Urine Pregnancy--Ancillary (enter results); Complete Time: 12:45            sv  

                                                                                             

11:56 Order name: Basic Metabolic Panel; Complete Time: 12:45                                 cp  

                                                                                             

12:46 Interpretation: Normal except: CA 8.4.                                                  cp  

                                                                                             

11:56 Order name: CBC with Diff; Complete Time: 12:45                                         cp  

                                                                                             

12:46 Interpretation: Normal except: ZEHRA% 90.5; LYM% 6.5; MN% 2.5.                            cp  

                                                                                             

11:56 Order name: Creatinine for Radiology; Complete Time: 12:45                              cp  

                                                                                             

11:56 Order name: Hepatic Function; Complete Time: 12:45                                      cp  

                                                                                             

12:46 Interpretation: Normal except: AST 14; GLOB 3.6.                                        cp  

                                                                                             

11:56 Order name: Lipase; Complete Time: 12:45                                                cp  

                                                                                             

11:56 Order name: CT Abd/Pelvis - W/Contrast; Complete Time: 14:47                            cp  

                                                                                             

14:47 Interpretation: Report reviewed.                                                        cp  

                                                                                             

12:25 Order name: CBC Smear Scan; Complete Time: 12:45                                        EDMS

                                                                                             

11:41 Order name: Urine Dipstick-Ancillary (obtain specimen); Complete Time: 11:50            cp  

                                                                                             

11:41 Order name: Urine Pregnancy Test (obtain specimen); Complete Time: 11:50                cp  

                                                                                             

11:56 Order name: IV Saline Lock; Complete Time: 12:15                                        cp  

                                                                                             

11:56 Order name: Labs collected and sent; Complete Time: 12:15                               cp  

                                                                                                  

Administered Medications:                                                                         

12:10 Drug: Zofran 4 mg Route: IVP; Site: right antecubital;                                  sv  

12:35 Follow up: Response: No adverse reaction; No change in condition                        sv  

12:10 Drug: NS 0.9% 1000 ml Route: IV; Rate: 1 bolus; Site: right antecubital;                sv  

13:20 Follow up: Response: No adverse reaction; IV Status: Completed infusion; IV Intake:     sv  

      1000ml                                                                                      

12:12 Drug: TORadol 30 mg Route: IVP; Site: right antecubital;                                sv  

12:30 Follow up: Response: No adverse reaction                                                sv  

12:40 Drug: Zofran 4 mg Route: IVP; Site: right antecubital;                                  sv  

13:15 Follow up: Response: No adverse reaction; No change in condition                        sv  

13:19 Drug: Phenergan 12.5 mg Route: IVP; Site: right antecubital;                            sv  

13:30 Follow up: Response: No adverse reaction                                                sv  

                                                                                                  

                                                                                                  

Disposition:                                                                                      

                                                                                             

08:25 Co-signature as Attending Physician, Lamont Luu MD I agree with the assessment and  terrence 

      plan of care.                                                                               

                                                                                                  

Disposition:                                                                                      

18 14:52 Discharged to Home. Impression: Nausea and vomiting, Lower abdominal pain,         

  unspecified, Low back pain.                                                                     

- Condition is Stable.                                                                            

- Discharge Instructions: Abdominal Pain, Adult, Back Pain, Adult, Nausea and Vomiting,           

  Adult.                                                                                          

- Prescriptions for Bentyl 20 mg Oral Tablet - take 1 tablet by ORAL route every 6                

  hours As needed; 20 tablet. Ibuprofen 600 mg Oral Tablet - take 1 tablet by ORAL                

  route every 6 hours As needed take with food; 30 tablet. promethazine 25 mg Oral                

  Tablet - take 1 tablet by ORAL route every 6 hours As needed; 20 tablet.                        

- Medication Reconciliation Form, Thank You Letter, Antibiotic Education, Prescription            

  Opioid Use form.                                                                                

- Follow up: Private Physician; When: 2 - 3 days; Reason: Recheck today's complaints.             

- Problem is new.                                                                                 

- Symptoms have improved.                                                                         

                                                                                                  

                                                                                                  

                                                                                                  

Signatures:                                                                                       

Dispatcher [employer]                           EDMS                                                 

Adriana Fierro RN                     RN   aj1                                                  

Nava Garcia RN RN   sv                                                   

Lamont Luu MD MD cha Page, Corey, [place]   cp                                                   

                                                                                                  

Corrections: (The following items were deleted from the chart)                                    

                                                                                             

12:46 12:46 Normal except. cp                                                                 cp  

15:03 14:52 2018 14:52 Discharged to Home. Impression: Nausea and vomiting; Lower       sv  

      abdominal pain, unspecified; Low back pain. Condition is Stable. Forms are Medication       

      Reconciliation Form, Thank You Letter, Antibiotic Education, Prescription Opioid Use.       

      Follow up: Private Physician; When: 2 - 3 days; Reason: Recheck today's complaints.         

      Problem is new. Symptoms have improved. cp                                                  

                                                                                                  

**************************************************************************************************

## 2018-11-25 NOTE — RAD REPORT
EXAM DESCRIPTION:  CTAbdomen   Pelvis W Contrast - 11/25/2018 2:06 pm

 

CLINICAL HISTORY:  Abdominal pain.

lower abdomen pain

 

COMPARISON:  [facility] dated 11/26/2017

 

TECHNIQUE:  Biphasic CT imaging of the abdomen and pelvis was performed with 100 ml non-ionic IV cont
rast.

 

All CT scans are performed using dose optimization technique as appropriate and may include automated
 exposure control or mA/KV adjustment according to patient size.

 

FINDINGS:  The lung bases are clear.

 

The liver, spleen, pancreas, adrenal glands and kidneys are within normal limits.

 

No bowel obstruction, free air, free fluid or abscess.  The appendix is normal.  No evidence of signi
ficant lymphadenopathy.

 

No suspicious bony findings.

 

IMPRESSION:  No acute intra-abdominal or pelvic finding.

## 2019-01-28 ENCOUNTER — HOSPITAL ENCOUNTER (EMERGENCY)
Dept: HOSPITAL 97 - ER | Age: 24
Discharge: HOME | End: 2019-01-28
Payer: SELF-PAY

## 2019-01-28 VITALS — SYSTOLIC BLOOD PRESSURE: 109 MMHG | DIASTOLIC BLOOD PRESSURE: 69 MMHG | OXYGEN SATURATION: 100 %

## 2019-01-28 DIAGNOSIS — Z72.0: ICD-10-CM

## 2019-01-28 DIAGNOSIS — M43.6: Primary | ICD-10-CM

## 2019-01-28 PROCEDURE — 81025 URINE PREGNANCY TEST: CPT

## 2019-01-28 PROCEDURE — 81003 URINALYSIS AUTO W/O SCOPE: CPT

## 2019-01-28 PROCEDURE — 96372 THER/PROPH/DIAG INJ SC/IM: CPT

## 2019-01-28 PROCEDURE — 99283 EMERGENCY DEPT VISIT LOW MDM: CPT

## 2019-01-28 NOTE — EDPHYS
Physician Documentation                                                                           

 [facility]                                                                

Name: Urvashi Witt                                                                                  

Age: 23 yrs                                                                                       

Sex: Female                                                                                       

: 1995                                                                                   

MRN: [id number]                                                                                   

Arrival Date: 2019                                                                          

Time: 09:13                                                                                       

Account#: [id number]                                                                            

Bed 17                                                                                            

Private MD:                                                                                       

ED Physician Wendi De Dios                                                                    

HPI:                                                                                              

                                                                                             

09:35 This 23 yrs old  Female presents to ER via Ambulatory with complaints of Neck   kb  

      Pain, <24hrs Old, Arm Pain.                                                                 

09:35 The patient or guardian complains of decreased range of motion, pain, that is acute,    kb  

      tenderness. The symptoms are located on the right posterior aspect of neck and right        

      lateral aspect of neck. Onset: The symptoms/episode began/occurred 2 day(s) ago.            

      Context: The problem was sustained at home, The neck injury/problem resulted from woke      

      up with the pain. Associated signs and symptoms: The patient has no apparent associated     

      signs or symptoms, The patient denies any alcohol use. The patient is not apparently        

      intoxicated. No neurological symptoms were experienced by the patient prior to arrival      

      in the emergency department. The pain radiates to the right arm. Modifying factors: The     

      symptoms are alleviated by nothing. the symptoms are aggravated by movement, pressure.      

      Severity of symptoms: At their worst the symptoms were moderate, in the emergency           

      department the symptoms are unchanged. The patient has not experienced similar symptoms     

      in the past. The patient has not recently seen a physician.                                 

                                                                                                  

OB/GYN:                                                                                           

09:19 LMP N/A - .                                                                             tw2 

                                                                                                  

Historical:                                                                                       

- Allergies:                                                                                      

09:15 No Known Allergies;                                                                     sv  

- Home Meds:                                                                                      

09:15 None [Active];                                                                          sv  

- PMHx:                                                                                           

09:15 None;                                                                                   sv  

- PSHx:                                                                                           

09:15 None;                                                                                   sv  

                                                                                                  

- Immunization history:: Flu vaccine is not up to date.                                           

- Social history:: Smoking status: Patient uses tobacco products, denies chronic                  

  smoking, but will smoke occasionally.                                                           

- Ebola Screening: : No symptoms or risks identified at this time.                                

                                                                                                  

                                                                                                  

ROS:                                                                                              

09:35 Constitutional: Negative for fever, chills, and weight loss, Cardiovascular: Negative   kb  

      for chest pain, palpitations, and edema, Respiratory: Negative for shortness of breath,     

      cough, wheezing, and pleuritic chest pain, Abdomen/GI: Negative for abdominal pain,         

      nausea, vomiting, diarrhea, and constipation, MS/Extremity: Negative for injury and         

      deformity, Skin: Negative for injury, rash, and discoloration, Neuro: Negative for          

      headache, weakness, numbness, tingling, and seizure.                                        

09:35 Neck: Positive for pain with movement, tenderness, tightness.                               

                                                                                                  

Exam:                                                                                             

09:42 Constitutional:  This is a well developed, well nourished patient who is awake, alert,  kb  

      and in no acute distress. Head/Face:  Normocephalic, atraumatic. Chest/axilla:  Normal      

      chest wall appearance and motion.  Nontender with no deformity.  No lesions are             

      appreciated. Cardiovascular:  Regular rate and rhythm with a normal S1 and S2.  No          

      gallops, murmurs, or rubs.  Normal PMI, no JVD.  No pulse deficits. Respiratory:  Lungs     

      have equal breath sounds bilaterally, clear to auscultation and percussion.  No rales,      

      rhonchi or wheezes noted.  No increased work of breathing, no retractions or nasal          

      flaring. Abdomen/GI:  Soft, non-tender, with normal bowel sounds.  No distension or         

      tympany.  No guarding or rebound.  No evidence of tenderness throughout. Skin:  Warm,       

      dry with normal turgor.  Normal color with no rashes, no lesions, and no evidence of        

      cellulitis. MS/ Extremity:  Pulses equal, no cyanosis.  Neurovascular intact.  Full,        

      normal range of motion. Neuro:  Awake and alert, GCS 15, oriented to person, place,         

      time, and situation.  Cranial nerves II-XII grossly intact.  Motor strength 5/5 in all      

      extremities.  Sensory grossly intact.  Cerebellar exam normal.  Normal gait.                

09:42 Neck: External neck: tenderness, that is moderate, of the  right posterior aspect of        

      neck and right lateral aspect of neck.                                                      

                                                                                                  

Vital Signs:                                                                                      

09:15  / 69; Pulse 75; Resp 18; Pulse Ox 100% ; Weight 53.07 kg; Height 5 ft. 1 in.     sv  

      (154.94 cm); Pain 10/10;                                                                    

09:15 Body Mass Index 22.11 (53.07 kg, 154.94 cm)                                             sv  

                                                                                                  

MDM:                                                                                              

09:27 Patient medically screened.                                                             kb  

09:34 Data reviewed: vital signs, nurses notes. Data interpreted: Pulse oximetry: on room air kb  

      is 100 %. Interpretation: normal. Counseling: I had a detailed discussion with the          

      patient and/or guardian regarding: the historical points, exam findings, and any            

      diagnostic results supporting the discharge/admit diagnosis, the need for outpatient        

      follow up, a family practitioner, to return to the emergency department if symptoms         

      worsen or persist or if there are any questions or concerns that arise at home.             

                                                                                                  

                                                                                             

09:51 Order name: Urine Dipstick--Ancillary (enter results)                                   bd  

                                                                                             

09:51 Order name: Urine Pregnancy--Ancillary (enter results)                                  bd  

                                                                                                  

Administered Medications:                                                                         

09:53 Drug: TORadol 60 mg Route: IM; Site: right gluteus;                                     tw2 

10:11 Follow up: Response: No adverse reaction; Pain is decreased                             tw2 

                                                                                                  

                                                                                                  

Disposition:                                                                                      

17:43 Co-signature as Attending Physician, Wendi De Dios MD.                               ma2 

                                                                                                  

Disposition:                                                                                      

19 09:43 Discharged to Home. Impression: Torticollis.                                       

- Condition is Stable.                                                                            

- Discharge Instructions: Acute Torticollis, Adult.                                               

- Prescriptions for Cyclobenzaprine 10 mg Oral Tablet - take 1 tablet by ORAL route               

  every 8 hours As needed; 21 tablet. Diclofenac Sodium 75 mg Oral Tablet, Delayed                

  Release (E.C.) - take 1 tablet by ORAL route 2 times per day As needed; 30 tablet.              

- Medication Reconciliation Form, Thank You Letter, Antibiotic Education, Prescription            

  Opioid Use, Work release form form.                                                             

- Follow up: Emergency Department; When: As needed; Reason: Worsening of condition.               

  Follow up: Private Physician; When: 2 - 3 days; Reason: Recheck today's complaints,             

  Continuance of care, Re-evaluation by your physician.                                           

                                                                                                  

                                                                                                  

                                                                                                  

Signatures:                                                                                       

Dispatcher [employer]                           EDGosia Caceres, Nava Peng RN RN                                                      

Yasemin Ramirez RN RN   [facility]                                                  

Wendi De Dios MD MD   [facility]                                                  

                                                                                                  

Corrections: (The following items were deleted from the chart)                                    

10:12 09:43 2019 09:43 Discharged to Home. Impression: Torticollis. Condition is        tw2 

      Stable. Discharge Instructions: Acute Torticollis, Adult. Prescriptions for                 

      Cyclobenzaprine 10 mg Oral Tablet - take 1 tablet by ORAL route every 8 hours As            

      needed; 21 tablet, Diclofenac Sodium 75 mg Oral Tablet, Delayed Release (E.C.) - take 1     

      tablet by ORAL route 2 times per day As needed; 30 tablet. and Forms are Medication         

      Reconciliation Form, Thank You Letter, Antibiotic Education, Prescription Opioid Use.       

      Follow up: Emergency Department; When: As needed; Reason: Worsening of condition.           

      Follow up: Private Physician; When: 2 - 3 days; Reason: Recheck today's complaints,         

      Continuance of care, Re-evaluation by your physician. kb                                    

                                                                                                  

**************************************************************************************************

## 2019-01-28 NOTE — ER
Nurse's Notes                                                                                     

 [facility]                                                                

Name: Urvashi Witt                                                                                  

Age: 23 yrs                                                                                       

Sex: Female                                                                                       

: 1995                                                                                   

MRN: [id number]                                                                                   

Arrival Date: 2019                                                                          

Time: 09:13                                                                                       

Account#: [id number]                                                                            

Bed 17                                                                                            

Private MD:                                                                                       

Diagnosis: Torticollis                                                                            

                                                                                                  

Presentation:                                                                                     

                                                                                             

09:15 Presenting complaint: Patient states: right neck pain and right arm pain, unable to     sv  

      move neck x 2 days. Transition of care: patient was not received from another setting       

      of care. Onset of symptoms was 2019. Care prior to arrival: None.               

09:15 Method Of Arrival: Ambulatory                                                           sv  

09:15 Acuity: YAMILET 4                                                                           sv  

09:18 Risk Assessment: Do you want to hurt yourself or someone else? Patient reports no       tw2 

      desire to harm self or others. Initial Sepsis Screen: Does the patient meet any 2           

      criteria? No. Patient's initial sepsis screen is negative. Does the patient have a          

      suspected source of infection? No. Patient's initial sepsis screen is negative.             

                                                                                                  

Triage Assessment:                                                                                

09:16 General: Appears in no apparent distress. uncomfortable, Behavior is calm, cooperative, sv  

      appropriate for age. Pain: Complains of pain in right arm, right posterior aspect of        

      neck, right lateral aspect of neck and right anterior aspect of neck Pain currently is      

      10 out of 10 on a pain scale. Neuro: Level of Consciousness is awake, alert, obeys          

      commands, Oriented to person, place, time, situation, Gait is steady. Respiratory:          

      Respiratory effort is even, unlabored, Respiratory pattern is regular, symmetrical.         

                                                                                                  

OB/GYN:                                                                                           

09:19 LMP N/A - .                                                                             tw2 

                                                                                                  

Historical:                                                                                       

- Allergies:                                                                                      

09:15 No Known Allergies;                                                                     sv  

- Home Meds:                                                                                      

09:15 None [Active];                                                                          sv  

- PMHx:                                                                                           

09:15 None;                                                                                   sv  

- PSHx:                                                                                           

09:15 None;                                                                                   sv  

                                                                                                  

- Immunization history:: Flu vaccine is not up to date.                                           

- Social history:: Smoking status: Patient uses tobacco products, denies chronic                  

  smoking, but will smoke occasionally.                                                           

- Ebola Screening: : No symptoms or risks identified at this time.                                

                                                                                                  

                                                                                                  

Screenin:18 Abuse screen: Denies threats or abuse. Nutritional screening: No deficits noted.        tw2 

      Tuberculosis screening: No symptoms or risk factors identified. Fall Risk None              

      identified.                                                                                 

                                                                                                  

Assessment:                                                                                       

09:19 General: Appears in no apparent distress. Behavior is calm, cooperative, appropriate    tw2 

      for age. Pain: Complains of pain in neck and right anterior aspect of neck and right        

      lateral aspect of neck and right posterior aspect of neck. Neuro: Level of                  

      Consciousness is awake, alert, obeys commands, Oriented to person, place, time,             

      situation. Cardiovascular: Patient's skin is warm and dry. Respiratory: Airway is           

      patent Respiratory effort is even, unlabored, Respiratory pattern is regular,               

      symmetrical. GI: No signs and/or symptoms were reported involving the gastrointestinal      

      system. : No signs and/or symptoms were reported regarding the genitourinary system.      

      EENT: No signs and/or symptoms were reported regarding the EENT system. Derm: No signs      

      and/or symptoms reported regarding the dermatologic system. Musculoskeletal: Reports        

      pain in neck and right anterior aspect of neck and right lateral aspect of neck and         

      right posterior aspect of neck.                                                             

10:11 Reassessment: Patient appears in no apparent distress at this time. Patient and/or      tw2 

      family updated on plan of care and expected duration. Pain level reassessed. Patient is     

      alert, oriented x 3, equal unlabored respirations, skin warm/dry/pink.                      

                                                                                                  

Vital Signs:                                                                                      

09:15  / 69; Pulse 75; Resp 18; Pulse Ox 100% ; Weight 53.07 kg; Height 5 ft. 1 in.     sv  

      (154.94 cm); Pain 1010;                                                                    

09:15 Body Mass Index 22.11 (53.07 kg, 154.94 cm)                                             sv  

                                                                                                  

ED Course:                                                                                        

09:13 Patient arrived in ED.                                                                  rg4 

09:15 Triage completed.                                                                       sv  

09:16 Arm band placed on Patient placed in an exam room, on a stretcher.                      sv  

09:18 Yasemin Ramirez, RN is Primary Nurse.                                                        tw2 

09:18 Bed in low position. Call light in reach.                                               tw2 

09:27 [facility] Ledbetter [facility]C [facility]                                                        kb  

09:27 Wendi De Dios MD is Attending Physician.                                           kb  

10:03 Urine collected: clean catch specimen, clear.                                           mh5 

10:04 Urine Pregnancy--Ancillary (enter results) Sent.                                        mh5 

10:04 Urine Dipstick--Ancillary (enter results) Sent.                                         mh5 

10:11 No provider procedures requiring assistance completed. Patient did not have IV access   tw2 

      during this emergency room visit.                                                           

                                                                                                  

Administered Medications:                                                                         

09:53 Drug: TORadol 60 mg Route: IM; Site: right gluteus;                                     tw2 

10:11 Follow up: Response: No adverse reaction; Pain is decreased                             tw2 

                                                                                                  

                                                                                                  

Outcome:                                                                                          

09:43 Discharge ordered by MD. dhillon  

10:11 Discharged to home ambulatory.                                                          tw2 

10:11 Condition: stable                                                                           

10:11 Discharge instructions given to patient, Instructed on discharge instructions, follow       

      up and referral plans. no drinking with medication, no driving heavy equipment,             

      medication usage, Demonstrated understanding of instructions, follow-up care,               

      medications, Prescriptions given X 2.                                                       

10:12 Patient left the ED.                                                                    tw2 

                                                                                                  

Signatures:                                                                                       

Gosia Ledbetter, BELINDAP-C                 FNP-Nava Verma, RN                    RN   Yasemin Seals RN                          RN   2                                                  

Sylwia Garcia                                 [facility]                                                  

Lola Rai                              Health system                                                  

                                                                                                  

Corrections: (The following items were deleted from the chart)                                    

09:17 09:15 Pulse 75bpm; Resp 18bpm; Pulse Ox 100%; 53.07 kg; Height 5 ft. 1 in.; BMI: 22.1;  sv  

      Pain 10/10; sv                                                                              

                                                                                                  

**************************************************************************************************

## 2019-07-17 ENCOUNTER — HOSPITAL ENCOUNTER (EMERGENCY)
Dept: HOSPITAL 97 - ER | Age: 24
Discharge: HOME | End: 2019-07-17
Payer: SELF-PAY

## 2019-07-17 VITALS — DIASTOLIC BLOOD PRESSURE: 61 MMHG | OXYGEN SATURATION: 99 % | SYSTOLIC BLOOD PRESSURE: 119 MMHG

## 2019-07-17 VITALS — TEMPERATURE: 98.5 F

## 2019-07-17 DIAGNOSIS — O20.0: Primary | ICD-10-CM

## 2019-07-17 DIAGNOSIS — Z3A.09: ICD-10-CM

## 2019-07-17 LAB
BUN BLD-MCNC: 15 MG/DL (ref 7–18)
GLUCOSE SERPLBLD-MCNC: 79 MG/DL (ref 74–106)
HCT VFR BLD CALC: 39.5 % (ref 36–45)
LYMPHOCYTES # SPEC AUTO: 1.6 K/UL (ref 0.7–4.9)
PMV BLD: 8.6 FL (ref 7.6–11.3)
POTASSIUM SERPL-SCNC: 3.5 MMOL/L (ref 3.5–5.1)
RBC # BLD: 4.39 M/UL (ref 3.86–4.86)
UA COMPLETE W REFLEX CULTURE PNL UR: (no result)

## 2019-07-17 PROCEDURE — 36415 COLL VENOUS BLD VENIPUNCTURE: CPT

## 2019-07-17 PROCEDURE — 81015 MICROSCOPIC EXAM OF URINE: CPT

## 2019-07-17 PROCEDURE — 76801 OB US < 14 WKS SINGLE FETUS: CPT

## 2019-07-17 PROCEDURE — 86901 BLOOD TYPING SEROLOGIC RH(D): CPT

## 2019-07-17 PROCEDURE — 86900 BLOOD TYPING SEROLOGIC ABO: CPT

## 2019-07-17 PROCEDURE — 76705 ECHO EXAM OF ABDOMEN: CPT

## 2019-07-17 PROCEDURE — 87088 URINE BACTERIA CULTURE: CPT

## 2019-07-17 PROCEDURE — 99283 EMERGENCY DEPT VISIT LOW MDM: CPT

## 2019-07-17 PROCEDURE — 87086 URINE CULTURE/COLONY COUNT: CPT

## 2019-07-17 PROCEDURE — 85025 COMPLETE CBC W/AUTO DIFF WBC: CPT

## 2019-07-17 PROCEDURE — 80048 BASIC METABOLIC PNL TOTAL CA: CPT

## 2019-07-17 NOTE — XMS REPORT
Encounter Summary

 Created on:July 10, 2019



Patient:Urvashi Witt

Sex:Female

:1995

External Reference #:[id number]





Demographics







 Address  [address]



   [place] [address]

 

 Home Phone  [phone]

 

 Preferred Language  English

 

 Marital Status  Never 

 

 Scientologist Affiliation  Unknown

 

 Race  White

 

 Ethnic Group   or /Somali









Author







Care Team Providers







 Name  Role  Phone

 

 Frankie Low  Ob/Gyn  [phone]









Reason for Visit







 Nurse Visit







Instructions







         1. Urine pregnancy test positive

 

              pregnancy test, urine

 

         2.  screening

 

              CBC w/ auto diff

 

              HIV (1+2) Ab screen, serum

 

              culture, urine

 

              abo group + rh type, blood

 

              HBsAg (hepatitis B surface Ag), serum

 

              rubella Ab, titer, serum

 

              RPR (rapid plasma reagin), serum

 

              antibody screen, serum or plasma

 

              urinalysis, dipstick

 

         3. Mild hyperemesis-not delivered

 

              Reglan 10 mg tablet

 

              US, obstetric, transvaginal

 

         4. Recurrent urinary tract infection



Discussion Note: None recorded.Patient educational handouts: No information 
available.



Plan of Care







 Reminders      Provider



       

 

      Appointments            OB Follow up         Frankie Low,



     2019  MD



     10:30AM  



       

 

                  OB Sono         [facility]



     2019  



     10:30AM  



       

 

      Lab            Pregnancy Test,         In-House Results



   Urine  07/10/2019  



       

 

                  CBC W/ Auto         [place]



   Diff  07/10/2019  [facility] (Lab)

 

                  HIV (1+2) Ab         [place]



   Screen, Serum  07/10/2019  [facility] (Lab)

 

                  Culture, Urine         [place]



     07/10/2019  [facility] (Lab)

 

                  Abo Group + Rh         [place]



   Type, Blood  07/10/2019  [facility] (Lab)

 

                  HBsAg         [place]



   (Hepatitis B Surface Ag),  07/10/2019  [facility]



   Serum    [place] (Lab)

 

                  Rubella Ab,         [place]



   Titer, Serum  07/10/2019  [facility] (Lab)

 

                  RPR (Rapid         [place]



   Plasma Reagin), Serum  07/10/2019  [facility] (Lab)

 

                  Antibody         [place]



   Screen, Serum or Plasma  07/10/2019  [facility] (Lab)

 

                  Urinalysis,         In-House Results



   Dipstick  07/10/2019  



       

 

      Referral            None recorded.              



       

 

      Procedures            None recorded.              



       

 

      Surgeries            None recorded.              



       

 

      Imaging            US, Obstetric,         In-House Results



   Transvaginal  07/10/2019  



       







Medications







 Name  Start Date    



       









 Reglan 10 mg tablet  



  Take 1 tablet 4 times a day by oral route.  







Medications Administered

 None recorded.



Vitals







 Height  Weight  BMI  Blood Pressure

 

  5 ft 1 in   127.3 lbs   24.1 kg/m2   109/77 mm[Hg]







Lab Results







 Date  Name  Specimen  Result  Interpretation  Description  Value  Range  
Status  Address  



                     



                     



                     



                     



                     









 07/10/2019  Urinalysis,         Leukocytes  Negative      In-House



   Dipstick                Results:



                   For



                   Internal



                   Use Only



                   

 

            Nitrite  negative      In-House



                   Results:



                   For



                   Internal



                   Use Only



                   

 

            Urobilinogen  .2      In-House



                   Results:



                   For



                   Internal



                   Use Only



                   

 

            Protein  Negative      In-House



                   Results:



                   For



                   Internal



                   Use Only



                   

 

            Ph  7.5      In-House



                   Results:



                   For



                   Internal



                   Use Only



                   

 

            Blood  Negative      In-House



                   Results:



                   For



                   Internal



                   Use Only



                   

 

            Specific  1.020      In-House



           Gravity        Results:



                   For



                   Internal



                   Use Only



                   

 

            Ketone  Negative      In-House



                   Results:



                   For



                   Internal



                   Use Only



                   

 

            Bilirubin  Negative      In-House



                   Results:



                   For



                   Internal



                   Use Only



                   

 

            Glucose  Negative      In-House



                   Results:



                   For



                   Internal



                   Use Only



                   

 

            Appearance  Slightly      In-House



             Cloudy      Results:



                   For



                   Internal



                   Use Only



                   

 

            Color  Yellow      In-House



                   Results:



                   For



                   Internal



                   Use Only



                   

 

 07/10/2019  Pregnancy Test,  Urine       Pregnancy Test  positive      In-
House



   Urine                Results:



                   For



                   Internal



                   Use Only



                   

 

   US, Obstetric,        No observation        In-House



   Transvaginal        recorded.        Results:



                   For



                   Internal



                   Use Only



                   







Allergies







 Code  Code System  Name  Reaction  Severity  Status  Onset



             









 NKDA        







Problems







 Name  Status  Onset Date  Source  



         









 Pregnant  Active  07/10/2019  

 

 Otitis Media  Active    External

 

 Constipation  Active    External

 

 Acute Cystitis  Active    

 

 Recurrent Urinary Tract Infection  Active    

 

 Postpartum Depression  Active    

 

 Vaginal Delivery  Active    External

 

 Episiotomy Infection  Active    

 

 Hip Pain  Active    External

 

 Backache  Active    External

 

 Chest Wall Pain  Active    External







Procedures







 Date  Name  Performed by  



       









 07/10/2019  US, Obstetric, Transvaginal  In-House Results



     For Internal Use Only



     [id number]







Vaccine List

None recorded.



Social History







 Smoking Status  Never Smoker  







Past Encounters







 07/10/2019



 Urine Pregnancy Test Positive;  Screening; Mild Hyperemesis-not 
Delivered; Recurrent Urinary Tract Infection



 Frankie Low MD: [address] [facility], [address], [place] [address], 
Ph. 







History of Present Illness

None recorded.



Review of Systems







   OB/GYN ROS

 

 Reported By:  Patient

 

 Constitutional:  Constitutional: no fatigue, no fever, no significant weight 
gain,



   no significant weight loss

 

 Skin:  Skin: no abnormal moles, no rashes

 

 Eyes:  Eyes: no irritation, no vision changes

 

 ENMT:  ENMT: no hearing loss, no ear pain, no nose/sinus problems, no sore



   throat, no snoring, no dry mouth, no mouth ulcers

 

 Respiratory:  Respiratory: no dyspnea / shortness of breath, no cough, no 
sputum



   production, no hemoptysis, no wheezing

 

 Cardiovascular:  Cardiovascular: no chest pain, no palpitations, no orthopnea

 

 Gastrointestinal:  Gastrointestinal: no heartburn, no dysphagia, no abdominal 
pain, no



   bowel movement changes, no diarrhea, no constipation, no rectal



   bleeding, nausea, vomiting

 

 Genitourinary:  Genitourinary: no hematuria, no abnormal bleeding, no flank 
pain,



   no trouble urinating, no incontinence, no rash, no lesion, no



   discharge, no vaginal odor, no vaginal itching

 

 Endocrine:  Menstrual: no menstrual problems, no PMDD symptoms. Menopausal: no



   menopausal symptoms. Sexual: no sexual problems

 

 Musculoskeletal:  Musculoskeletal: no muscle aches, no muscle weakness, no



   arthralgias/joint pain, no back pain

 

 Neurological:  Neurologic: no headaches, no dizziness, no LOC, no weakness, no



   numbness, no seizures

 

 Psychological:  Psych: no depression, no alcoholism, no sleep disturbances







Physical Exam

None recorded.

## 2019-07-17 NOTE — EDPHYS
Physician Documentation                                                                           

 [facility]                                                                 

Name: Urvashi Witt                                                                                  

Age: 24 yrs                                                                                       

Sex: Female                                                                                       

: 1995                                                                                   

MRN: [id number]                                                                                   

Arrival Date: 2019                                                                          

Time: 20:05                                                                                       

Account#: [id number]                                                                            

Bed 20                                                                                            

Private MD: Asha Overton                                                                         

ED Physician Marvin Mason                                                                             

HPI:                                                                                              

                                                                                             

21:06 This 24 yrs old  Female presents to ER via Ambulatory with complaints of        snw 

      Vaginal Bleeding, + Preg <12wks.                                                            

21:06 The patient presents with vaginal bleeding that is light. Onset: The symptoms/episode   snw 

      began/occurred suddenly, this morning. Modifying factors: The symptoms are alleviated       

      by nothing. Associated signs and symptoms: Pertinent positives: upper abd cramping.         

      Severity of symptoms: At their worst the symptoms were moderate. The patient is             

      sexually active. The patient has not experienced similar symptoms in the past. The          

      patient has not recently seen a physician, sees OB in [place]. Pt noted to have            

      subconjunctival hemorrhage to right. When asked what happened pt states, "there was an      

      incident". Pt denies trauma to abdomen.                                                     

                                                                                                  

OB/GYN:                                                                                           

21:06  2, Full Term 1                                                                  snw 

                                                                                                  

Historical:                                                                                       

- Allergies:                                                                                      

20:28 No Known Allergies;                                                                     aj  

- Home Meds:                                                                                      

20:28 None [Active];                                                                          aj  

- PMHx:                                                                                           

20:28 None;                                                                                   aj  

- PSHx:                                                                                           

20:28 None;                                                                                   aj  

                                                                                                  

- Immunization history:: Adult Immunizations up to date.                                          

- Social history:: Smoking status: Patient/guardian denies using tobacco.                         

- Ebola Screening: : Patient negative for fever greater than or equal to 101.5 degrees            

  Fahrenheit, and additional compatible Ebola Virus Disease symptoms Patient denies               

  exposure to infectious person Patient denies travel to an Ebola-affected area in the            

  21 days before illness onset No symptoms or risks identified at this time.                      

                                                                                                  

                                                                                                  

ROS:                                                                                              

21:04 Constitutional: Negative for fever, chills, and weight loss, Eyes: Negative for injury, snw 

      pain, redness, and discharge, ENT: Negative for injury, pain, and discharge, Neck:          

      Negative for injury, pain, and swelling, Cardiovascular: Negative for chest pain,           

      palpitations, and edema, Respiratory: Negative for shortness of breath, cough,              

      wheezing, and pleuritic chest pain, Back: Negative for injury and pain, MS/Extremity:       

      Negative for injury and deformity, Skin: Negative for injury, rash, and discoloration,      

      Neuro: Negative for headache, weakness, numbness, tingling, and seizure.                    

21:04 Abdomen/GI: Positive for abdominal pain, abdominal cramps.                                  

21:04 : Positive for vaginal bleeding.                                                          

                                                                                                  

Exam:                                                                                             

21:04 Constitutional:  This is a well developed, well nourished patient who is awake, alert,  snw 

      and in no acute distress. Head/Face:  Normocephalic, atraumatic. Eyes:  Pupils equal        

      round and reactive to light, extra-ocular motions intact.  Lids and lashes normal.          

      Conjunctiva and sclera are non-icteric and not injected.  Cornea within normal limits.      

      Periorbital areas with no swelling, redness, or edema. ENT:  Nares patent. No nasal         

      discharge, no septal abnormalities noted.  Tympanic membranes are normal and external       

      auditory canals are clear.  Oropharynx with no redness, swelling, or masses, exudates,      

      or evidence of obstruction, uvula midline.  Mucous membranes moist. Neck:  Trachea          

      midline, no thyromegaly or masses palpated, and no cervical lymphadenopathy.  Supple,       

      full range of motion without nuchal rigidity, or vertebral point tenderness.  No            

      Meningismus. Chest/axilla:  Normal chest wall appearance and motion.  Nontender with no     

      deformity.  No lesions are appreciated. Cardiovascular:  Regular rate and rhythm with a     

      normal S1 and S2.  No gallops, murmurs, or rubs.  Normal PMI, no JVD.  No pulse             

      deficits. Respiratory:  Lungs have equal breath sounds bilaterally, clear to                

      auscultation and percussion.  No rales, rhonchi or wheezes noted.  No increased work of     

      breathing, no retractions or nasal flaring. Back:  No spinal tenderness.  No                

      costovertebral tenderness.  Full range of motion. Skin:  Warm, dry with normal turgor.      

      Normal color with no rashes, no lesions, and no evidence of cellulitis. MS/ Extremity:      

      Pulses equal, no cyanosis.  Neurovascular intact.  Full, normal range of motion. Neuro:     

       Awake and alert, GCS 15, oriented to person, place, time, and situation.  Cranial          

      nerves II-XII grossly intact.  Motor strength 5/5 in all extremities.  Sensory grossly      

      intact.  Cerebellar exam normal.  Normal gait. Psych:  Awake, alert, with orientation       

      to person, place and time.  Behavior, mood, and affect are within normal limits.            

21:04 Eyes:  Pupils equal round and reactive to light, extra-ocular motions intact.  Lids and     

      lashes normal.  Conjunctiva and sclera are non-icteric and not injected.  + right           

      subconjunctival hemorrhage noted. Cornea within normal limits.  Periorbital areas with      

      no swelling, redness, or edema.                                                             

21:04 Abdomen/GI: Inspection: gravid appearance, is noted, Bowel sounds: normal, Palpation:       

      mild abdominal tenderness, in the right upper quadrant and left upper quadrant.             

                                                                                                  

Vital Signs:                                                                                      

20:28  / 65; Pulse 66; Resp 16; Temp 98.5; Pulse Ox 100% on R/A; Weight 58.06 kg;       aj  

      Height 5 ft. 1 in. (154.94 cm);                                                             

20:38  / 77; Pulse 86; Resp 18; Pulse Ox 97% on R/A;                                    tl2 

21:42  / 63; Pulse 71; Resp 18; Pulse Ox 100% ;                                         tl2 

22:22  / 61; Pulse 75; Resp 18; Pulse Ox 99% on R/A;                                    tl2 

20:28 Body Mass Index 24.19 (58.06 kg, 154.94 cm)                                             aj  

                                                                                                  

MDM:                                                                                              

20:31 Patient medically screened.                                                             snw 

21:54 Data reviewed: vital signs, nurses notes. Data interpreted: Pulse oximetry: on room air snw 

      is 100 %. Interpretation: normal. Counseling: I had a detailed discussion with the          

      patient and/or guardian regarding: the historical points, exam findings, and any            

      diagnostic results supporting the discharge/admit diagnosis, lab results, radiology         

      results, the need for outpatient follow up, to return to the emergency department if        

      symptoms worsen or persist or if there are any questions or concerns that arise at          

      home. Special discussion: Based on the history and exam findings, there is no               

      indication for further emergent testing or inpatient evaluation. I discussed with the       

      patient/guardian the need to see the OB Gyne specialist for further evaluation of the       

      symptoms. I discussed with the patient/guardian the need to see the primary care            

      provider for further evaluation of the symptoms.                                            

                                                                                                  

                                                                                             

20:41 Order name: Urine Microscopic Only; Complete Time: 21:52                                tl2 

                                                                                             

20:48 Order name: Abo/rh Typing; Complete Time: 21:38                                         snw 

                                                                                             

20:48 Order name: Basic Metabolic Panel; Complete Time: 21:32                                 snw 

                                                                                             

20:48 Order name: CBC with Diff; Complete Time: 21:24                                         snw 

                                                                                             

21:00 Order name: US Abdomen Limited; Complete Time: 21:38                                    snw 

                                                                                             

21:50 Order name: Urine Culture                                                               EDMS

                                                                                             

20:41 Order name: Urine Pregnancy Test (obtain specimen); Complete Time: 20:41                tl2 

                                                                                             

20:41 Order name: Urine Dipstick-Ancillary (obtain specimen); Complete Time: 20:41            tl2 

                                                                                             

20:48 Order name: Labs collected and sent; Complete Time: 21:07                               snw 

                                                                                             

20:48 Order name: NPO; Complete Time: 20:54                                                   snw 

                                                                                             

21:29 Order name: 1St Trimest Single 1St Fetus; Complete Time: 21:46                          EDMS

                                                                                                  

Administered Medications:                                                                         

22:01 Drug: Macrobid 100 mg Route: PO;                                                        tl2 

22:20 Follow up: Response: No adverse reaction; Medication administered at discharge.         tl2 

                                                                                                  

                                                                                                  

Disposition:                                                                                      

                                                                                             

00:38 Co-signature as Attending Physician, Marvin Mason MD.                                        [email or web address] 

                                                                                                  

Disposition:                                                                                      

19 21:47 Discharged to Home. Impression: Threatened .                               

- Condition is Stable.                                                                            

- Discharge Instructions: Threatened Miscarriage, Vaginal Bleeding During Pregnancy,              

  First Trimester, Subchorionic Hematoma, First Trimester of Pregnancy, Pregnancy and             

  Urinary Tract Infection, Pelvic Rest.                                                           

- Prescriptions for Prenatal Vitamin 27- 0.8 mg Oral Tablet - take 1 tablet by ORAL               

  route once daily; 60 tablet. Macrobid 100 mg Oral Capsule - take 1 capsule by ORAL              

  route every 12 hours for 7 days; 14 capsule.                                                    

- Medication Reconciliation Form, Thank You Letter, Antibiotic Education, Prescription            

  Opioid Use, Work release form form.                                                             

- Follow up: Asha Overton MD; When: 1 - 2 days; Reason: Recheck today's complaints,             

  Continuance of care, Re-evaluation by your physician. Follow up: Emergency                      

  Department; When: As needed; Reason: Worsening of condition.                                    

                                                                                                  

                                                                                                  

                                                                                                  

Signatures:                                                                                       

Dispatcher [employer]                           EDPriscilla Epperson RN RN aj Lam, Pin, MD MD   pkl                                                  

Tete Gusman, FNP-C                 FNP-Csnw                                                  

Alexandra Anderson RN                        RN   tl2                                                  

                                                                                                  

Corrections: (The following items were deleted from the chart)                                    

 20:16 Allergies: tramadol; aj                                                           aj  

 20:16 Immunization history: Adult Immunizations up to date, aj                          aj  

 20:16 Social history: Smoking status: Patient uses tobacco products, smokes one-half    aj  

      pack cigarettes per day, aj                                                                 

 20:16 Ebola Screening: Patient negative for fever greater than or equal to 101.5        aj  

      degrees Fahrenheit, and additional compatible Ebola Virus Disease symptoms Patient          

      denies exposure to infectious person Patient denies travel to an Ebola-affected area in     

      the 21 days before illness onset No symptoms or risks identified at this time aj            

 20:16 Allergies: Demerol; aj                                                            aj  

 20:16 Allergies: Codeine; aj                                                            aj  

 20:16 Allergies: Motrin; aj                                                             aj  

 20:16 Home Meds: lisinopril 20 mg Oral tab 1 tab once daily; aj                         aj  

 20:16 PMHx: Hypertension; aj                                                            aj  

 20:16 PMHx: Chronic pain; aj                                                            aj  

 20:16 PMHx: Arthritis; aj                                                               aj  

 20:16 PSHx: neck; aj                                                                    aj  

:29 21:01 Transvaginal Ob+US.RAD.BRZ ordered. EDMS                                          EDMS

22:21 20:48 IV Saline Lock ordered. snw                                                       tl2 

22:23 21:47 2019 21:47 Discharged to Home. Impression: Threatened . Condition   tl2 

      is Stable. Forms are Medication Reconciliation Form, Thank You Letter, Antibiotic           

      Education, Prescription Opioid Use. Follow up: Asha Overton; When: 1 - 2 days; Reason:      

      Recheck today's complaints, Continuance of care, Re-evaluation by your physician.           

      Follow up: Emergency Department; When: As needed; Reason: Worsening of condition. snw       

                                                                                                  

**************************************************************************************************

## 2019-07-17 NOTE — RAD REPORT
EXAM DESCRIPTION:  US - 1St Trimest Single 1St Fetus - 7/17/2019 9:28 pm

 

CLINICAL HISTORY:  ABD CRAMPING, PREGNANT

 

COMPARISON:  1St Trimest Single 1St Fetus dated 5/8/2018

 

FINDINGS:  A single gestational sac is seen within the uterus. The shape of the sac is within normal 
limits for gestational age. Within the sac is a single fetal pole with crown-rump length of 2.7 cm, c
orrelating to estimated gestational age of 9 weeks 3 days. Estimated date of delivery is 02/16/2020.

 

Heart rate is 166 BPM.

 

The placenta is not yet developed due to early gestational age. 13 x 5 mm subchorionic bleed noted.

 

The maternal adnexa and ovaries are within normal limits. Normal Doppler blood flow was demonstrated 
to both ovaries.

 

 

IMPRESSION:  Single live early intrauterine gestation with estimated gestational age of 9 weeks 3 day
s, REGINO 02/16/2020.

 

13 x 5 mm subchorionic bleed.

## 2019-07-17 NOTE — RAD REPORT
EXAM DESCRIPTION:  US - Abdomen Exam Limited - 7/17/2019 9:28 pm

 

CLINICAL HISTORY:  ABD PAIN

 

COMPARISON:  <Comparisons>

 

FINDINGS:  The gallbladder demonstrates no gallstones. No pericholecystic fluid or gallbladder wall t
hickening. The common bile duct is normal measuring 2 mm.

 

The liver demonstrates no findings of intrahepatic biliary dilatation.

 

IMPRESSION:  Unremarkable examination.

## 2019-07-17 NOTE — ER
Nurse's Notes                                                                                     

 [facility]                                                                 

Name: Urvashi Witt                                                                                  

Age: 24 yrs                                                                                       

Sex: Female                                                                                       

: 1995                                                                                   

MRN: [id number]                                                                                   

Arrival Date: 2019                                                                          

Time: 20:05                                                                                       

Account#: [id number]                                                                            

Bed 20                                                                                            

Private MD: Asha Overton                                                                         

Diagnosis: Threatened                                                                     

                                                                                                  

Presentation:                                                                                     

                                                                                             

20:26 Presenting complaint: Patient states: Blood in toilet after urinated today. Patient DX  aj  

      with UTI but has not gotten RX called in. Transition of care: patient was not received      

      from another setting of care. Onset of symptoms was 2019. Risk Assessment: Do      

      you want to hurt yourself or someone else? Patient reports no desire to harm self or        

      others. Initial Sepsis Screen: Does the patient meet any 2 criteria? No. Patient's          

      initial sepsis screen is negative. Does the patient have a suspected source of              

      infection? No. Patient's initial sepsis screen is negative. Care prior to arrival: None.    

20:26 Method Of Arrival: Ambulatory                                                           aj  

20:26 Acuity: YAMILET 3                                                                           aj  

                                                                                                  

Triage Assessment:                                                                                

20:28 General: Appears in no apparent distress. comfortable, Behavior is calm, cooperative,   aj  

      appropriate for age. Pain: Denies pain. Neuro: Level of Consciousness is awake, alert,      

      obeys commands, Oriented to person, place, time, situation, Appropriate for age.            

      Respiratory: Airway is patent Respiratory effort is even, unlabored, Respiratory            

      pattern is regular, symmetrical. : Reports foul smelling urine. Derm: Skin is intact,     

      is healthy with good turgor, Skin is pink, warm \T\ dry. normal.                            

                                                                                                  

OB/GYN:                                                                                           

21:06  2, Full Term 1                                                                  snw 

                                                                                                  

Historical:                                                                                       

- Allergies:                                                                                      

20:28 No Known Allergies;                                                                     aj  

- Home Meds:                                                                                      

20:28 None [Active];                                                                          aj  

- PMHx:                                                                                           

20:28 None;                                                                                   aj  

- PSHx:                                                                                           

20:28 None;                                                                                   aj  

                                                                                                  

- Immunization history:: Adult Immunizations up to date.                                          

- Social history:: Smoking status: Patient/guardian denies using tobacco.                         

- Ebola Screening: : Patient negative for fever greater than or equal to 101.5 degrees            

  Fahrenheit, and additional compatible Ebola Virus Disease symptoms Patient denies               

  exposure to infectious person Patient denies travel to an Ebola-affected area in the            

  21 days before illness onset No symptoms or risks identified at this time.                      

                                                                                                  

                                                                                                  

Screenin:38 Abuse screen: Denies threats or abuse. Nutritional screening: No deficits noted.        tl2 

      Tuberculosis screening: No symptoms or risk factors identified. Fall Risk None              

      identified.                                                                                 

                                                                                                  

Assessment:                                                                                       

20:38 General: Appears in no apparent distress. uncomfortable, Behavior is calm, cooperative, tl2 

      appropriate for age. General: Pt reports having pyelo in the past during pregnancy but      

      is not experiencing the same symptoms at this time. Pt states that she is having a          

      small amount of vaginal bleeding . Pain: Complains of pain in abdomen. Neuro: Level of      

      Consciousness is awake, alert, obeys commands, Oriented to person, place, time,             

      situation. Respiratory: Airway is patent Respiratory effort is even, unlabored,             

      Respiratory pattern is regular, symmetrical. GI: Reports lower abdominal pain,              

      cramping. : Reports burning with urination, urinary frequency, vaginal bleeding that      

      is bright red, light flow. Derm: Skin is pink, warm \T\ dry.                                

21:30 Reassessment: Patient appears in no apparent distress at this time. Patient and/or      tl2 

      family updated on plan of care and expected duration. Pain level reassessed. Patient is     

      alert, oriented x 3, equal unlabored respirations, skin warm/dry/pink.                      

22:21 Reassessment: Patient appears in no apparent distress at this time. Patient and/or      tl2 

      family updated on plan of care and expected duration. Pain level reassessed. Patient is     

      alert, oriented x 3, equal unlabored respirations, skin warm/dry/pink. pt verbalized        

      understanding of discharge instructions, need for follow up and prescription usage.         

                                                                                                  

Vital Signs:                                                                                      

20:28  / 65; Pulse 66; Resp 16; Temp 98.5; Pulse Ox 100% on R/A; Weight 58.06 kg;       aj  

      Height 5 ft. 1 in. (154.94 cm);                                                             

20:38  / 77; Pulse 86; Resp 18; Pulse Ox 97% on R/A;                                    tl2 

21:42  / 63; Pulse 71; Resp 18; Pulse Ox 100% ;                                         tl2 

22:22  / 61; Pulse 75; Resp 18; Pulse Ox 99% on R/A;                                    tl2 

20:28 Body Mass Index 24.19 (58.06 kg, 154.94 cm)                                               

                                                                                                  

ED Course:                                                                                        

20:05 Patient arrived in ED.                                                                  ag3 

20:05 Asha Overton MD is Private Physician.                                                 ag3 

20:05 [facility]                                                        snw 

20:05 Marvin Mason MD is Attending Physician.                                                    snw 

20:15 Triage completed.                                                                       aj  

20:28 Arm band placed on right wrist. Patient placed in an exam room.                         aj  

20:31 Alexandra Anderson, RN is Primary Nurse.                                                      tl2 

20:38 Patient has correct armband on for positive identification. Bed in low position. Call   tl2 

      light in reach. Side rails up X 1.                                                          

20:38 Urine collected: clean catch specimen, bob colored.                                   tl2 

21:29 US Abdomen Limited In Process Unspecified.                                              EDMS

21:29 1St Trimest Single 1St Fetus In Process Unspecified.                                    EDMS

21:47 Asha Overton MD is Referral Physician.                                                snw 

                                                                                                  

Administered Medications:                                                                         

22:01 Drug: Macrobid 100 mg Route: PO;                                                        tl2 

22:20 Follow up: Response: No adverse reaction; Medication administered at discharge.         tl2 

                                                                                                  

                                                                                                  

Outcome:                                                                                          

21:47 Discharge ordered by MD.                                                                snw 

22:23 Patient left the ED.                                                                    tl2 

                                                                                                  

Signatures:                                                                                       

Dispatcher [employer]                           EDPriscilla Epperson RN                       RN   Tete Etienne FNP-C                 FNP-Csnw                                                  

Alexandra Anderson RN                        RN   tl2                                                  

Lacey Puckett                                 ag3                                                  

                                                                                                  

Corrections: (The following items were deleted from the chart)                                    

20:17 20:16 Allergies: tramadol; aj                                                           aj  

20:17 20:16 Immunization history: Adult Immunizations up to date, aj                          aj  

20:17 20:16 Social history: Smoking status: Patient uses tobacco products, smokes one-half    aj  

      pack cigarettes per day, aj                                                                 

20:17 20:16 Ebola Screening: Patient negative for fever greater than or equal to 101.5        aj  

      degrees Fahrenheit, and additional compatible Ebola Virus Disease symptoms Patient          

      denies exposure to infectious person Patient denies travel to an Ebola-affected area in     

      the 21 days before illness onset No symptoms or risks identified at this time aj            

20:18 20:14 Presenting complaint: Patient states: Dizziness, palpitations, chest pain that    aj  

      started today aj                                                                            

20:18 20:14 Transition of care: patient was not received from another setting of care. aj     aj  

20:18 20:14 Onset of symptoms was 2019 aj                                            aj  

20:18 20:14 Risk Assessment: Do you want to hurt yourself or someone else? Patient reports no aj  

      desire to harm self or others.                                                            

: 20:14 Initial Sepsis Screen: Does the patient meet any 2 criteria? No. Patient's        aj  

      initial sepsis screen is negative. Does the patient have a suspected source of              

      infection? No. Patient's initial sepsis screen is negative.                               

: 20:14 Care prior to arrival: None. aj                                                     

 20:14 Method Of Arrival: Wheelchair aj                                                    

 20:14 Acuity: YAMILET 3 aj                                                                    

 20:16 Allergies: Demerol; aj                                                              

 20:16 Allergies: Codeine; aj                                                              

 20:16 Allergies: Motrin; aj                                                               

 20:16 Home Meds: lisinopril 20 mg Oral tab 1 tab once daily; aj                           

 20:16 PMHx: Hypertension; aj                                                              

 20:16 PMHx: Chronic pain; aj                                                              

 20:16 PMHx: Arthritis; aj                                                                 

 20:16 PSHx: neck; aj                                                                      

 22:21 Reassessment: Patient appears in no apparent distress at this time. Patient       tl2 

      and/or family updated on plan of care and expected duration. Pain level reassessed.         

      Patient is alert, oriented x 3, equal unlabored respirations, skin warm/dry/pink. tl2       

22:22 21:30  / 61; Pulse 75bpm; Resp 18bpm; Pulse Ox 99% RA; tl2                        tl2 

                                                                                                  

**************************************************************************************************

## 2019-10-07 ENCOUNTER — HOSPITAL ENCOUNTER (EMERGENCY)
Dept: HOSPITAL 97 - ER | Age: 24
Discharge: HOME | End: 2019-10-07
Payer: COMMERCIAL

## 2019-10-07 VITALS — OXYGEN SATURATION: 98 % | DIASTOLIC BLOOD PRESSURE: 60 MMHG | SYSTOLIC BLOOD PRESSURE: 98 MMHG

## 2019-10-07 VITALS — TEMPERATURE: 98.6 F

## 2019-10-07 DIAGNOSIS — Z3A.20: ICD-10-CM

## 2019-10-07 DIAGNOSIS — O26.892: Primary | ICD-10-CM

## 2019-10-07 LAB
BUN BLD-MCNC: 12 MG/DL (ref 7–18)
GLUCOSE SERPLBLD-MCNC: 101 MG/DL (ref 74–106)
HCT VFR BLD CALC: 33.5 % (ref 36–45)
LYMPHOCYTES # SPEC AUTO: 1.7 K/UL (ref 0.7–4.9)
PMV BLD: 8.1 FL (ref 7.6–11.3)
POTASSIUM SERPL-SCNC: 3.6 MMOL/L (ref 3.5–5.1)
RBC # BLD: 3.73 M/UL (ref 3.86–4.86)

## 2019-10-07 PROCEDURE — 81003 URINALYSIS AUTO W/O SCOPE: CPT

## 2019-10-07 PROCEDURE — 85652 RBC SED RATE AUTOMATED: CPT

## 2019-10-07 PROCEDURE — 87086 URINE CULTURE/COLONY COUNT: CPT

## 2019-10-07 PROCEDURE — 80048 BASIC METABOLIC PNL TOTAL CA: CPT

## 2019-10-07 PROCEDURE — 81025 URINE PREGNANCY TEST: CPT

## 2019-10-07 PROCEDURE — 87088 URINE BACTERIA CULTURE: CPT

## 2019-10-07 PROCEDURE — 76815 OB US LIMITED FETUS(S): CPT

## 2019-10-07 PROCEDURE — 72146 MRI CHEST SPINE W/O DYE: CPT

## 2019-10-07 PROCEDURE — 86140 C-REACTIVE PROTEIN: CPT

## 2019-10-07 PROCEDURE — 85025 COMPLETE CBC W/AUTO DIFF WBC: CPT

## 2019-10-07 PROCEDURE — 36415 COLL VENOUS BLD VENIPUNCTURE: CPT

## 2019-10-07 PROCEDURE — 72148 MRI LUMBAR SPINE W/O DYE: CPT

## 2019-10-07 PROCEDURE — 87040 BLOOD CULTURE FOR BACTERIA: CPT

## 2019-10-07 PROCEDURE — 96374 THER/PROPH/DIAG INJ IV PUSH: CPT

## 2019-10-07 PROCEDURE — 99284 EMERGENCY DEPT VISIT MOD MDM: CPT

## 2019-10-07 NOTE — RAD REPORT
EXAM DESCRIPTION:  MRI - Lumbar Spine Wo Con- 10/7/2019 6:18 pm

 

CLINICAL HISTORY:  PAIN

Back pain, radiculopathy

 

COMPARISON:  No comparisons

 

FINDINGS:  Vertebral body heights are within normal limits.

 

No aggressive marrow pattern is observed. No fracture is suspected.

 

The conus medullaris terminates at a normal level. No thickening of the cauda equina or clumping of n
erve roots seen.

 

L1-2 level: No significant findings.

 

L2-3 level: No significant findings.

 

L3-4 level: No significant findings.

 

L4-5 level: No significant findings.

 

L5-S1 level: Mild posterior disc bulge.

 

Hydronephrosis is present, greater on the right, likely related to ongoing pregnancy.

 

IMPRESSION:  Minimal L5-S1 spondylosis.

## 2019-10-07 NOTE — ER
Nurse's Notes                                                                                     

 [facility]                                                                 

Name: Urvashi Witt                                                                                  

Age: 24 yrs                                                                                       

Sex: Female                                                                                       

: 1995                                                                                   

MRN: [id number]                                                                                   

Arrival Date: 10/07/2019                                                                          

Time: 13:25                                                                                       

Account#: [id number]                                                                            

Bed 14                                                                                            

Private MD:                                                                                       

Diagnosis: mid back pain, bilateral buttock pain, normal pregnancy                                

                                                                                                  

Presentation:                                                                                     

10/07                                                                                             

13:32 Presenting complaint: Patient states: bilateral hip pain, denies recent fall or injury. sv  

      Reports being 5 months pregnant and vaginal discharge clear. Transition of care:            

      patient was not received from another setting of care. Onset of symptoms was 2019. Risk Assessment: Do you want to hurt yourself or someone else? Patient            

      reports no desire to harm self or others. Initial Sepsis Screen: Does the patient meet      

      any 2 criteria? No. Patient's initial sepsis screen is negative. Does the patient have      

      a suspected source of infection? No. Patient's initial sepsis screen is negative. Care      

      prior to arrival: None.                                                                     

13:32 Method Of Arrival: Ambulatory                                                           sv  

13:32 Acuity: YAMILET 4                                                                           sv  

                                                                                                  

Triage Assessment:                                                                                

13:35 General: Appears in no apparent distress. uncomfortable, well developed, Behavior is    sv  

      cooperative, appropriate for age. Pain: Complains of pain in right and left hip Pain        

      currently is 10 out of 10 on a pain scale. Neuro: Level of Consciousness is awake,          

      alert, obeys commands, Gait is steady. Respiratory: Respiratory effort is even,             

      unlabored.                                                                                  

                                                                                                  

OB/GYN:                                                                                           

13:33 LMP 2019                                                                              sv  

                                                                                                  

Historical:                                                                                       

- Allergies:                                                                                      

13:33 No Known Allergies;                                                                     sv  

- PMHx:                                                                                           

13:33 None;                                                                                   sv  

                                                                                                  

- Ebola Screening: : No symptoms or risks identified at this time.                                

                                                                                                  

                                                                                                  

Screening:                                                                                        

15:52 Abuse screen: Denies threats or abuse. Denies injuries from another. Nutritional        bp  

      screening: No deficits noted. Tuberculosis screening: No symptoms or risk factors           

      identified. Fall Risk None identified.                                                      

                                                                                                  

Assessment:                                                                                       

14:30 General: Appears in no apparent distress. uncomfortable, Behavior is cooperative,       bp  

      appropriate for age, anxious. Pain: Complains of pain in pelvis. Neuro: No deficits         

      noted. Cardiovascular: No deficits noted. Respiratory: No deficits noted. GI: No signs      

      and/or symptoms were reported involving the gastrointestinal system. : Reports pain       

      in suprapubic area. EENT: No deficits noted. Derm: No deficits noted. Musculoskeletal:      

      No deficits noted.                                                                          

15:51 Reassessment: PT RETURNED FROM U/S.                                                     bp  

17:19 Reassessment: Patient and/or family updated on plan of care and expected duration. Pain bp  

      level reassessed. Patient is alert, oriented x 3, equal unlabored respirations, skin        

      warm/dry/pink. MRI PENDING.                                                                 

18:32 Reassessment: PT RETURNED FROM MRI.                                                     bp  

19:28 Reassessment: Patient appears in no apparent distress at this time. Patient and/or      aa1 

      family updated on plan of care and expected duration. Pain level reassessed. Patient is     

      alert, oriented x 3, equal unlabored respirations, skin warm/dry/pink. Awaiting             

      provider reassessment.                                                                      

20:30 Reassessment: Patient appears in no apparent distress at this time. Patient is alert,   aa1 

      oriented x 3, equal unlabored respirations, skin warm/dry/pink. Discussed d/c \T\ f/u       

      instructions with pt; denies questions or concerns at this time. Ambulatory to lobby        

      with steady gait. Patient states feeling better. Patient states symptoms have improved.     

                                                                                                  

Vital Signs:                                                                                      

13:34  / 64; Pulse 100; Resp 20; Temp 98.6(O); Pulse Ox 100% ; Weight 63.05 kg; Height  sv  

      5 ft. 2 in. (157.48 cm); Pain 10/10;                                                        

16:01  / 54; Pulse 72; Resp 16; Pulse Ox 99% ;                                          bp  

17:19 BP 91 / 47; Pulse 64; Resp 16; Pulse Ox 100% ;                                          bp  

18:32 BP 95 / 56; Pulse 70; Resp 16; Pulse Ox 100% ;                                          bp  

19:28 BP 98 / 60; Pulse 81; Resp 16; Pulse Ox 98% on R/A;                                     aa1 

20:30  / 61; Pulse 79; Resp 16; Temp 98.2; Pulse Ox 99% on R/A; Pain 4/10;              aa1 

13:34 Body Mass Index 25.42 (63.05 kg, 157.48 cm)                                             sv  

                                                                                                  

ED Course:                                                                                        

13:25 Patient arrived in ED.                                                                  mr  

13:31 Arm band placed on.                                                                     sv  

13:32 Triage completed.                                                                       sv  

14:31 Renny Champion MD is Attending Physician.                                              kdr 

14:37 Kris Aguiar, RN is Primary Nurse.                                                    bp  

15:20 Inserted saline lock: 20 gauge in right antecubital area, using aseptic technique.      bp  

      Blood collected.                                                                            

15:39 OB Limited In Process Unspecified.                                                      EDMS

15:52 Patient has correct armband on for positive identification. Bed in low position. Call   bp  

      light in reach. Side rails up X2.                                                           

18:03 MRI Lumbar Spine wo Con In Process Unspecified.                                         EDMS

18:03 Thoracic Spine Wo Contr In Process Unspecified.                                         EDMS

20:30 No provider procedures requiring assistance completed. IV discontinued, intact,         aa1 

      bleeding controlled, No redness/swelling at site. Pressure dressing applied.                

                                                                                                  

Administered Medications:                                                                         

15:50 Drug: morphine 2 mg Route: IVP; Site: right antecubital;                                bp  

16:39 Follow up: Response: Pain is decreased                                                  bp  

16:30 Drug: Nitrofurantoin 100 mg Route: PO;                                                  bp  

19:05 Follow up: Response: No adverse reaction                                                aa1 

                                                                                                  

                                                                                                  

Outcome:                                                                                          

20:03 Discharge ordered by MD.                                                                kdr 

20:30 Discharged to home ambulatory, with significant other.                                  aa1 

20:30 Condition: good                                                                             

20:30 Discharge instructions given to patient, significant other, Instructed on discharge         

      instructions, follow up and referral plans. medication usage, Demonstrated                  

      understanding of instructions, follow-up care, medications, Prescriptions given X 1.        

20:33 Patient left the ED.                                                                    aa1 

                                                                                                  

Signatures:                                                                                       

Dispatcher [employer]                           Nava Sánchez RN RN sv Autenrieth, Alissa, RN                  RN   aa1                                                  

Renny Champion MD MD kdr Rivera, Mary mr Peltier, Brian, RN                      RN   bp                                                   

                                                                                                  

Corrections: (The following items were deleted from the chart)                                    

13:34 13:32 Presenting complaint: Patient states: bilateral hip pain, denies recent fall or   sv  

      injury. sv                                                                                  

                                                                                                  

**************************************************************************************************

## 2019-10-07 NOTE — RAD REPORT
EXAM DESCRIPTION:  US - OB Limited - 10/7/2019 3:38 pm

 

CLINICAL HISTORY:  back and bilateral hip pain

Fetal age.

 

COMPARISON:  OB Complete dated 7/26/2016

 

FINDINGS:  A limited examination was requested by referring physician.

 

A single variable presenting gestation is identified. Heart rate normal.

 

Placenta is anterior without evidence of abruption or previa.

 

Amniotic fluid volume is normal. Right ovary was obscured by bowel gas. The left ovary demonstrates n
ormal blood flow.

 

IMPRESSION:  No acute or pathologic finding of concern seen on this limited examination. Consider ded
icated second trimester follow-up obstetrical complete study.

## 2019-10-07 NOTE — EDPHYS
Physician Documentation                                                                           

 [facility]                                                                 

Name: Urvashi Witt                                                                                  

Age: 24 yrs                                                                                       

Sex: Female                                                                                       

: 1995                                                                                   

MRN: [id number]                                                                                   

Arrival Date: 10/07/2019                                                                          

Time: 13:25                                                                                       

Account#: [id number]                                                                            

Bed 14                                                                                            

Private MD:                                                                                       

ED Physician Renny Champion                                                                       

HPI:                                                                                              

10/07                                                                                             

18:15 This 24 yrs old  Female presents to ER via Ambulatory with complaints of Hip    kdr 

      Pain.                                                                                       

18:15 The patient or guardian reports pain.                                                   kdr 

18:15 that occurred at home, sustained from unknown reason, There is no obvious deformity,    kdr 

      The patient is able to self ambulate. The patient is able to bear their full body           

      weight. There is no radiation of the patient's discomfort. The complaints affect the        

      left gluteus adele and right gluteus adele. Onset: The symptoms/episode                 

      began/occurred suddenly, this morning. Modifying factors: The symptoms are alleviated       

      by nothing, remaining still, laying on her side, the symptoms are aggravated by any         

      movement, extension. Associated signs and symptoms: Loss of consciousness: the patient      

      experienced no loss of consciousness, Pertinent positives: Mid back pain - made worse       

      by movement of legs. Severity of symptoms: At their worst the symptoms were severe,         

      incapacitating, in the emergency department the symptoms are unchanged. The patient has     

      not experienced similar symptoms in the past. The patient has been recently seen by a       

      physician: the patient's primary care provider.                                             

                                                                                                  

OB/GYN:                                                                                           

13:33 LMP 2019                                                                              sv  

                                                                                                  

Historical:                                                                                       

- Allergies:                                                                                      

13:33 No Known Allergies;                                                                     sv  

- PMHx:                                                                                           

13:33 None;                                                                                   sv  

                                                                                                  

- Ebola Screening: : No symptoms or risks identified at this time.                                

                                                                                                  

                                                                                                  

ROS:                                                                                              

18:15 Constitutional: Negative for fever, chills, and weight loss, Eyes: Negative for injury, kdr 

      pain, redness, and discharge, ENT: Negative for injury, pain, and discharge, Neck:          

      Negative for injury, pain, and swelling, Cardiovascular: Negative for chest pain,           

      palpitations, and edema, Respiratory: Negative for shortness of breath, cough,              

      wheezing, and pleuritic chest pain, Abdomen/GI: Negative for abdominal pain, nausea,        

      vomiting, diarrhea, and constipation, : Negative for injury, bleeding, discharge, and     

      swelling, Skin: Negative for injury, rash, and discoloration, Neuro: Negative for           

      headache, weakness, numbness, tingling, and seizure activity. Psych: Negative for           

      depression, anxiety, suicide ideation, homicidal ideation, and hallucinations,              

      Allergy/Immunology: Negative for hives, rash, and allergies, Endocrine: Negative for        

      neck swelling, polydipsia, polyuria, polyphagia, and marked weight changes,                 

      Hematologic/Lymphatic: Negative for swollen nodes, abnormal bleeding, and unusual           

      bruising.                                                                                   

18:15 Back: Positive for decreased range of motion, pain at rest, pain with movement, of the      

      , Negative for                                                                              

                                                                                                  

Exam:                                                                                             

18:15 Constitutional:  This is a well developed, well nourished patient who is awake, alert,  kdr 

      and in moderate distress. Head/Face:  Normocephalic, atraumatic. Eyes:  Pupils equal        

      round and reactive to light, extra-ocular motions intact.  Lids and lashes normal.          

      Conjunctiva and sclera are non-icteric and not injected.  Cornea within normal limits.      

      Periorbital areas with no swelling, redness, or edema. Neck:  Trachea midline, no           

      thyromegaly or masses palpated, and no cervical lymphadenopathy.  Supple, full range of     

      motion without nuchal rigidity, or vertebral point tenderness.  No Meningismus.             

      Chest/axilla:  Normal chest wall appearance and motion.  Nontender with no deformity.       

      No lesions are appreciated. Cardiovascular:  Regular rate and rhythm with a normal S1       

      and S2.  No gallops, murmurs, or rubs.  Normal PMI, no JVD.  No pulse deficits.             

      Respiratory:  Lungs have equal breath sounds bilaterally, clear to auscultation and         

      percussion.  No rales, rhonchi or wheezes noted.  No increased work of breathing, no        

      retractions or nasal flaring. Abdomen/GI:  Soft, non-tender, with normal bowel sounds.      

      No distension or tympany.  No guarding or rebound.  No evidence of tenderness               

      throughout. Skin:  Warm, dry with normal turgor.  Normal color with no rashes, no           

      lesions, and no evidence of cellulitis. Neuro:  Awake and alert, GCS 15, oriented to        

      person, place, time, and situation.  Cranial nerves II-XII grossly intact.  Motor           

      strength 5/5 in all extremities.  Sensory grossly intact.  Cerebellar exam normal.          

      Normal gait. Psych:  Awake, alert, with orientation to person, place and time.              

      Behavior, mood, and affect are within normal limits.                                        

18:15 Back: pain, that is mild, of the  lumbar area, ROM is painful, normal spinal alignment      

      noted, CVA tenderness, is absent, muscle spasm, is not present, Straight leg raises:        

      pain bilaterally, No meningismus .                                                          

                                                                                                  

Vital Signs:                                                                                      

13:34  / 64; Pulse 100; Resp 20; Temp 98.6(O); Pulse Ox 100% ; Weight 63.05 kg; Height  sv  

      5 ft. 2 in. (157.48 cm); Pain 10/10;                                                        

16:01  / 54; Pulse 72; Resp 16; Pulse Ox 99% ;                                          bp  

17:19 BP 91 / 47; Pulse 64; Resp 16; Pulse Ox 100% ;                                          bp  

18:32 BP 95 / 56; Pulse 70; Resp 16; Pulse Ox 100% ;                                          bp  

19:28 BP 98 / 60; Pulse 81; Resp 16; Pulse Ox 98% on R/A;                                     aa1 

20:30  / 61; Pulse 79; Resp 16; Temp 98.2; Pulse Ox 99% on R/A; Pain 4/10;              aa1 

13:34 Body Mass Index 25.42 (63.05 kg, 157.48 cm)                                             sv  

                                                                                                  

MDM:                                                                                              

18:15 Data reviewed: vital signs, nurses notes, lab test result(s), radiologic studies.       kdr 

      Counseling: I had a detailed discussion with the patient and/or guardian regarding: the     

      historical points, exam findings, and any diagnostic results supporting the                 

      discharge/admit diagnosis, lab results, radiology results.                                  

20:03 Patient medically screened.                                                             kdr 

                                                                                                  

10/07                                                                                             

14:53 Order name: CBC with Diff; Complete Time: 15:59                                         kdr 

10/07                                                                                             

14:53 Order name: Chem 7; Complete Time: 16:12                                                kdr 

10/07                                                                                             

14:53 Order name: Blood Culture Adult (2)                                                     kdr 

10/07                                                                                             

14:53 Order name: Urine Culture                                                               kdr 

10/07                                                                                             

14:55 Order name: ESR; Complete Time: 16:12                                                   kdr 

10/07                                                                                             

14:55 Order name: CRP; Complete Time: 16:12                                                   kdr 

10/07                                                                                             

15:03 Order name: OB Limited; Complete Time: 15:59                                            EDMS

10/07                                                                                             

15:33 Order name: Urine Dipstick--Ancillary (enter results); Complete Time: 15:59             bd  

10/07                                                                                             

15:33 Order name: Urine Pregnancy--Ancillary (enter results); Complete Time: 15:59            bd  

10/07                                                                                             

16:18 Order name: MRI Lumbar Spine wo Con; Complete Time: 19:58                               kdr 

10/07                                                                                             

16:21 Order name: Thoracic Spine Wo Contr; Complete Time: 19:58                               EDMS

10/07                                                                                             

14:53 Order name: Urine Dipstick-Ancillary (obtain specimen); Complete Time: 15:24            kdr 

                                                                                                  

Administered Medications:                                                                         

15:50 Drug: morphine 2 mg Route: IVP; Site: right antecubital;                                bp  

16:39 Follow up: Response: Pain is decreased                                                  bp  

16:30 Drug: Nitrofurantoin 100 mg Route: PO;                                                  bp  

19:05 Follow up: Response: No adverse reaction                                                aa1 

                                                                                                  

                                                                                                  

Disposition:                                                                                      

10/07/19 20:03 Discharged to Home. Impression: mid back pain, bilateral buttock pain, normal      

  pregnancy.                                                                                      

- Condition is Stable.                                                                            

- Discharge Instructions: Musculoskeletal Pain, Back Pain in Pregnancy, Second                    

  Trimester of Pregnancy, Easy-to-Read.                                                           

- Prescriptions for Tylenol- Codeine #4 300-60 mg Oral Tablet - take 1 tablet by ORAL             

  route every 6 hours As needed; 6 tablet.                                                        

- Work release form, Medication Reconciliation Form, Thank You Letter, Prescription               

  Opioid Use form.                                                                                

- Follow up: Private Physician; When: 2 - 3 days; Reason: If symptoms return, Further             

  diagnostic work-up, Recheck today's complaints, Continuance of care, Re-evaluation by           

  your physician.                                                                                 

- Problem is new.                                                                                 

- Symptoms have improved.                                                                         

                                                                                                  

                                                                                                  

                                                                                                  

Signatures:                                                                                       

Dispatcher [employer]                           [facility]                                                 

Nava Garcia RN                    RN                                                      

Sherrie Castaneda RN                  RN   aa1                                                  

Renny Champion MD MD kdr Peltier, Brian, RN                      RN   bp                                                   

                                                                                                  

Corrections: (The following items were deleted from the chart)                                    

15:03 14:56 Transvaginal Study (Probe)+[id number].BRZ ordered. [facility]

20:33 20:03 10/07/2019 20:03 Discharged to Home. Impression: mid back pain, bilateral buttock aa1 

      pain, normal pregnancy. Condition is Stable. Forms are Medication Reconciliation Form,      

      Thank You Letter, Antibiotic Education, Prescription Opioid Use. Follow up: Private         

      Physician; When: 2 - 3 days; Reason: If symptoms return, Further diagnostic work-up,        

      Recheck today's complaints, Continuance of care, Re-evaluation by your physician.           

      Problem is new. Symptoms have improved. kdr                                                 

                                                                                                  

**************************************************************************************************

## 2019-10-07 NOTE — RAD REPORT
EXAM DESCRIPTION:  MRI - Thoracic Spine Wo Contr - 10/7/2019 6:02 pm

 

CLINICAL HISTORY:  hip pain

Back pain, radiculopathy.

 

COMPARISON:  OB Limited dated 10/7/2019

 

FINDINGS:  The vertebral body heights and disc spaces are maintained. Marrow pattern of the thoracic 
spine is within normal limits.

 

No significant herniated disc, canal stenosis or foraminal stenosis at any level.

 

No paraspinal mass or hematoma.

 

The thoracic cord is normal in size and signal.

 

 

IMPRESSION:  Unremarkable study.

## 2020-02-22 ENCOUNTER — HOSPITAL ENCOUNTER (EMERGENCY)
Dept: HOSPITAL 97 - ER | Age: 25
Discharge: HOME | End: 2020-02-22
Payer: COMMERCIAL

## 2020-02-22 VITALS — SYSTOLIC BLOOD PRESSURE: 112 MMHG | DIASTOLIC BLOOD PRESSURE: 59 MMHG | TEMPERATURE: 98.4 F | OXYGEN SATURATION: 99 %

## 2020-02-22 DIAGNOSIS — R19.7: ICD-10-CM

## 2020-02-22 DIAGNOSIS — N39.0: Primary | ICD-10-CM

## 2020-02-22 LAB
ALBUMIN SERPL BCP-MCNC: 4.3 G/DL (ref 3.4–5)
ALP SERPL-CCNC: 165 U/L (ref 45–117)
ALT SERPL W P-5'-P-CCNC: 37 U/L (ref 12–78)
ANISOCYTOSIS BLD QL: (no result)
AST SERPL W P-5'-P-CCNC: 20 U/L (ref 15–37)
BLD SMEAR INTERP: (no result)
BUN BLD-MCNC: 11 MG/DL (ref 7–18)
GLUCOSE SERPLBLD-MCNC: 91 MG/DL (ref 74–106)
HCT VFR BLD CALC: 45.5 % (ref 36–45)
LYMPHOCYTES # SPEC AUTO: 0.5 K/UL (ref 0.7–4.9)
MORPHOLOGY BLD-IMP: (no result)
PMV BLD: 8.2 FL (ref 7.6–11.3)
POTASSIUM SERPL-SCNC: 3.5 MMOL/L (ref 3.5–5.1)
RBC # BLD: 5.17 M/UL (ref 3.86–4.86)
UA COMPLETE W REFLEX CULTURE PNL UR: (no result)

## 2020-02-22 PROCEDURE — 36415 COLL VENOUS BLD VENIPUNCTURE: CPT

## 2020-02-22 PROCEDURE — 87804 INFLUENZA ASSAY W/OPTIC: CPT

## 2020-02-22 PROCEDURE — 84145 PROCALCITONIN (PCT): CPT

## 2020-02-22 PROCEDURE — 87088 URINE BACTERIA CULTURE: CPT

## 2020-02-22 PROCEDURE — 87070 CULTURE OTHR SPECIMN AEROBIC: CPT

## 2020-02-22 PROCEDURE — 80053 COMPREHEN METABOLIC PANEL: CPT

## 2020-02-22 PROCEDURE — 85025 COMPLETE CBC W/AUTO DIFF WBC: CPT

## 2020-02-22 PROCEDURE — 87040 BLOOD CULTURE FOR BACTERIA: CPT

## 2020-02-22 PROCEDURE — 87086 URINE CULTURE/COLONY COUNT: CPT

## 2020-02-22 PROCEDURE — 81025 URINE PREGNANCY TEST: CPT

## 2020-02-22 PROCEDURE — 81015 MICROSCOPIC EXAM OF URINE: CPT

## 2020-02-22 PROCEDURE — 81003 URINALYSIS AUTO W/O SCOPE: CPT

## 2020-02-22 PROCEDURE — 74177 CT ABD & PELVIS W/CONTRAST: CPT

## 2020-02-22 PROCEDURE — 99284 EMERGENCY DEPT VISIT MOD MDM: CPT

## 2020-02-22 PROCEDURE — 96374 THER/PROPH/DIAG INJ IV PUSH: CPT

## 2020-02-22 PROCEDURE — 87081 CULTURE SCREEN ONLY: CPT

## 2020-02-22 PROCEDURE — 83605 ASSAY OF LACTIC ACID: CPT

## 2020-02-22 NOTE — ER
Nurse's Notes                                                                                     

 [facility]                                                                 

Name: Urvashi Witt                                                                                  

Age: 24 yrs                                                                                       

Sex: Female                                                                                       

: 1995                                                                                   

MRN: [id number]                                                                                   

Arrival Date: 2020                                                                          

Time: 06:13                                                                                       

Account#: [id number]                                                                            

Bed 20                                                                                            

Private MD:                                                                                       

Diagnosis: Urinary tract infection, site not specified;Diarrhea, unspecified;Fever, unspecified   

                                                                                                  

Presentation:                                                                                     

                                                                                             

06:27 Presenting complaint: Patient states: "I recently gave birth on the . I recently    jd3 

      started to get a fever and generalized body ache.". Transition of care: patient was not     

      received from another setting of care. Onset of symptoms was 2020. Risk        

      Assessment: Do you want to hurt yourself or someone else? Patient reports no desire to      

      harm self or others. Initial Sepsis Screen: Does the patient meet any 2 criteria? Temp      

      <36.0*C (96.8*F)) or > 38.3*C (100.9*F). HR > 90 bpm. Does the patient have a suspected     

      source of infection? No. Patient's initial sepsis screen is negative. Care prior to         

      arrival: None.                                                                              

06:27 Method Of Arrival: Ambulatory                                                           j 

06:27 Acuity: YAMILET 3                                                                           jd3 

                                                                                                  

OB/GYN:                                                                                           

06:31 LMP N/A - Recent birth                                                                  j 

                                                                                                  

Historical:                                                                                       

- Allergies:                                                                                      

06:31 No Known Allergies;                                                                     jd3 

- Home Meds:                                                                                      

06:31 None [Active];                                                                          jd3 

- PMHx:                                                                                           

06:31 None;                                                                                   jd3 

- PSHx:                                                                                           

06:31 None;                                                                                   jd3 

                                                                                                  

- Immunization history:: Adult Immunizations up to date.                                          

- Coronavirus screen:: The patient has NOT traveled to [place] in the past 14 days. The             

  patient has NOT had contact with known/suspected case of Coronavirus? Proceed with              

  normal triage procedures.                                                                       

- Social history:: Smoking status: Patient denies any tobacco usage or history of.                

- Ebola Screening: : Patient negative for fever greater than or equal to 101.5 degrees            

  Fahrenheit, and additional compatible Ebola Virus Disease symptoms.                             

                                                                                                  

                                                                                                  

Screenin:36 Abuse screen: Denies threats or abuse. Nutritional screening: No deficits noted.        jd3 

      Tuberculosis screening: No symptoms or risk factors identified. Fall Risk Ambulatory        

      Aid- None/Bed Rest/Nurse Assist (0 pts). Gait- Normal/Bed Rest/Wheelchair (0 pts)           

      Mental Status- Oriented to own ability (0 pts). Total Jamison Fall Scale indicates No         

      Risk (0-24 pts).                                                                            

                                                                                                  

Assessment:                                                                                       

06:33 General: Appears in no apparent distress. uncomfortable, Behavior is calm, cooperative, jd3 

      appropriate for age. Pain: Complains of pain in left ear, back, right breast and left       

      breast Quality of pain is described as aching, sharp. Neuro: Level of Consciousness is      

      awake, alert, obeys commands, Oriented to person, place, time, situation.                   

      Cardiovascular: Capillary refill < 3 seconds Thorax. Respiratory: Airway is patent          

      Respiratory effort is even, unlabored, Respiratory pattern is regular, symmetrical,         

      Denies cough. GI: Abdomen is flat, non-distended, Reports lack of appitite. : Denies      

      burning with urination, vaginal bleeding. EENT: Tympanic membrane clear on left ear and     

      right ear. Derm: Skin is intact, Skin is dry, Skin is normal, Skin temperature is warm.     

      Musculoskeletal: Circulation, motion, and sensation intact. Range of motion: intact in      

      all extremities.                                                                            

07:00 General: Appears uncomfortable, Behavior is calm, cooperative. Pain: Complains of pain  rb1 

      in left breast and right breast and back Pain currently is 8 out of 10 on a pain scale.     

      Neuro: Level of Consciousness is awake, alert, obeys commands, Oriented to person,          

      place, time, situation. Cardiovascular: Capillary refill < 3 seconds is brisk in            

      bilateral fingers. Respiratory: Airway is patent Respiratory effort is even, unlabored,     

      Respiratory pattern is regular, symmetrical. Derm: Skin is pink, warm \T\ dry.              

08:00 Reassessment: Patient appears in no apparent distress at this time. Patient and/or      rb1 

      family updated on plan of care and expected duration. Pain level reassessed. Patient is     

      alert, oriented x 3, equal unlabored respirations, skin warm/dry/pink.                      

08:41 Reassessment: Called pharmacy to bring Rocephin 1 g IVP to the floor.                   rb1 

08:58 Reassessment: Patient appears in no apparent distress at this time. No changes from     rb1 

      previously documented assessment. Pt. is bottle feeding the baby and talking with           

      family at the bedside.                                                                      

                                                                                                  

Vital Signs:                                                                                      

06:31  / 76; Pulse 114; Resp 17 S; Temp 101.5(O); Pulse Ox 99% on R/A; Weight 63.5 kg   jd3 

      (R); Height 5 ft. 1 in. (154.94 cm) (R); Pain 9/10;                                         

07:20  / 66; Pulse 79; Resp 16; Pulse Ox 98% on R/A;                                    rb1 

07:40 Temp 99.2(O);                                                                           rb1 

08:20  / 59; Pulse 71; Resp 17; Temp 98.4(O); Pulse Ox 99% on R/A;                      rb1 

06:31 Body Mass Index 26.45 (63.50 kg, 154.94 cm)                                             jd3 

                                                                                                  

ED Course:                                                                                        

06:13 Patient arrived in ED.                                                                  ds1 

06:15 Jose Aguirre PA is PHCP.                                                              jmm 

06:15 Lamont Luu MD is Attending Physician.                                             jmm 

06:25 Zohaib Hdez, RN is Primary Nurse.                                                  jd3 

06:30 Triage completed.                                                                       jd3 

06:33 Arm band placed on.                                                                     jd3 

06:36 Patient has correct armband on for positive identification. Placed in gown. Bed in low  jd3 

      position. Call light in reach. Side rails up X 1. Adult w/ patient.                         

06:40 Inserted saline lock: 20 gauge in right antecubital area, using aseptic technique.      rr5 

      ,using aseptic technique. inserted by [facility] tech Blood collected.                      

06:40 First set of blood cultures drawn by ED staff.                                          rr5 

06:48 Second set of blood cultures drawn.                                                     rr5 

08:04 CT completed. Patient tolerated procedure well. Patient moved back from CT.             bq  

09:07 No provider procedures requiring assistance completed. IV discontinued, intact,         rb1 

      bleeding controlled, No redness/swelling at site. Pressure dressing applied.                

                                                                                                  

Administered Medications:                                                                         

06:35 Drug: Ibuprofen 600 mg Route: PO;                                                       rr5 

06:53 Drug: NS 0.9% 1000 ml Route: IV; Rate: 1 bolus; Site: right antecubital;                jd3 

08:53 Drug: Rocephin 1 grams Route: IV; Rate: calculated rate; Site: right antecubital;       rb1 

09:06 Follow up: Response: No adverse reaction; IV Status: Completed infusion                 rb1 

                                                                                                  

                                                                                                  

Outcome:                                                                                          

08:44 Discharge ordered by MD.                                                                jmm 

09:07 Discharged to home ambulatory, with family.                                             rb1 

09:07 Condition: stable                                                                           

09:07 Discharge instructions given to patient, Instructed on discharge instructions, follow       

      up and referral plans. medication usage, Demonstrated understanding of instructions,        

      follow-up care, medications, Prescriptions given X 1.                                       

09:14 Patient left the ED.                                                                    rb1 

                                                                                                  

Signatures:                                                                                       

Jose Aguirre PA PA jmm Quilty, Betty bq Sanford, Demi                                ds1                                                  

María Mascorro, RN                     RN   rb1                                                  

Zohaib Hdez RN                    RN   jd3                                                  

Mikhail Casillas RN                      RN   rr5                                                  

                                                                                                  

Corrections: (The following items were deleted from the chart)                                    

08:22 07:00 Reassessment: Patient appears in no apparent distress at this time. Patient       rb1 

      and/or family updated on plan of care and expected duration. Pain level reassessed.         

      Patient is alert, oriented x 3, equal unlabored respirations, skin warm/dry/pink. rb1       

                                                                                                  

**************************************************************************************************

## 2020-02-22 NOTE — XMS REPORT
Continuity of Care Document

 Created on:2020



Patient:YADIEL DAUGHERTY

Sex:Female

:1995

External Reference #:[id number]





Demographics







 Address  [address] 



   [place] [address]

 

 Home Phone  [phone]

 

 Email Address   DECLINED

 

 Preferred Language  Unknown

 

 Marital Status  Never 

 

 Presybeterian Affiliation  non-Yazidi

 

 Race  Unknown

 

 Ethnic Group  Unknown









Author







 Organization  [facility]

 

 Address  [address] [place] [address]









Allergies, Adverse Reactions, Alerts







 Allergen  Type  Severity  Reaction  Last Updated  Verified  Status

 

 No Known Allergies  Allergy  Unknown    2014  No  Active







Medications







 Medication  Status  Dose  Units  Route  Sig  Qty  Days  Start Date  End Date  
Instructions









 Ibuprofen  Discontinued  1    ORAL  Every 6 Hours As   
7:05am  



           Needed as needed for      (One-Time)  



           Pain        









 Prenatal  Active  1    ORAL  Daily          



 Multivit-Min                    



 W/Fe-Fa *                    

 

 Acetaminophen W/  Discontinued  1-2    ORAL  Every 6  15    September  October 
27th,  



 Codeine #3 *          Hours As      2016  



           Needed for      6:59am    



           Pain          

 

 Amoxicillin/Clavu  Discontinued  1    ORAL  Twice A  ,  



 lanate Potassium          Day      2016 9:39am    

 

 Cephalexin *  Discontinued  1    ORAL  Twice A  14      September  



           Day        1st, 2016  

 

 Ferrous Sulfate  Discontinued  1    ORAL  Once Daily        2016  

 

 Ibuprofen  Discontinued  1    ORAL  Every 6  ,  



           Hours As      2016  



           Needed as      6:59am    



           needed for          



           Pain          

 

 Prenatal  Discontinued  1    ORAL  Daily  ,  



 Multivit-Min                    



 W/Fe-Fa *                    







Problems

Active Problems







 Medical Problem  Onset Date  Status

 

 39 weeks gestation of pregnancy    Active

 

 Acute chest wall pain    Active

 

 Acute pyelonephritis    Active

 

 Constipation    Active

 

 Constipation    Active

 

 Group B Streptococcus carrier, delivered, current hospitalization    Active

 

 [id number]    Active

 

 Left hip pain    Active

 

 Left otitis media    Active

 

 Vaginal delivery    Active







Procedures

No procedure information available.



Relevant Diagnostic Tests and/or Laboratory Data

Laboratory Results







 Test  Date/Time  Result  Interpretation  Reference  Result  Performing



         Range  Comment  Site

 

 White Blood  February  9.7    4.0-11.5    [facility], [address]



 Count  2020          



   11:05pm          [facility] [address]

 

 Red Blood  February  3.68    3.80-5.20    [facility], [address]



 Count  2020          



   11:05pm          [facility] [address]

 

 Hemoglobin  February  10.7    10.5-15.7    [facility], [address]



   2020          



   11:05pm          [place] [address]

 

 Hematocrit  February  33.4    34.0-50.0    [facility], [address]



   2020          



   11:05pm          [place] [address]

 

 Mean  February  90.8        [facility], [address]



 Corpuscular  2020          



 Volume  11:05pm          [place] [address]

 

 Mean  February  29.1    26.2-33.4    [facility], [address]



 Corpuscular  2020          



 Hemoglobin  11:05pm          [place] [address]

 

 Mean  February  32.0    30-34    [facility], [address]



 Corpuscular  2020          



 Hemoglobin  11:05pm          [place] [address]



 Concent            

 

 Red Cell  February  14.1    12.0-15.5    [facility], [address]



 Distribution  2020          



 Width  11:05pm          [place] [address]

 

 Platelet Count  February  218    165-450    [facility], [address]



   2020          



   11:05pm          [place] [address]

 

 Mean Platelet  February  9.9    9.4-12.6    [facility], [address]



 Volume  2020          



   11:05pm          [place] [address]

 

 Neutrophils  February  73.8    44.4-80.1    [facility], [address]



 (%) (Auto)  2020          



   11:05pm          [place] [address]

 

 Immature  February  0.6    0.0-0.4    [facility], [address]



 Granulocyte %  2020          



 (Auto)  11:05pm          [place] [address]

 

 Lymphocytes  February  19.3    10.0-50.0    [facility], [address]



 (%) (Auto)  2020          



   11:05pm          [place] [address]

 

 Monocytes (%)  February  5.9    3.6-12.0    [facility], [address]



 (Auto)  2020          



   11:05pm          [place] [address]

 

 Eosinophils  February  0.3    0.0-5.4    [facility], [address]



 (%) (Auto)  2020          



   11:05pm          [place] [address]

 

 Basophils (%)  February  0.1    0.1-1.2    [facility], [address]



 (Auto)  2020          



   11:05pm          [place] [address]

 

 Neutrophils #  February  7.17    1.56-6.13    [facility], [address]



 (Auto)  2020          



   11:05pm          [place] [address]

 

 Absolute  February  0.1    0.0-0.03    [facility], [address]



 Immature  2020          



 Granulocyte  11:05pm          [facility] [address]



 (auto            

 

 Lymphocytes #  February  1.9    1.18-3.74    [facility], [address]



 (Auto)  2020          



   11:05pm          [place] [address]

 

 Monocytes #  February  0.57    0.24-0.86    [facility], [address]



 (Auto)  2020          



   11:05pm          [place] [address]

 

 Eosinophils #  February  0.03    0.04-0.36    [facility], [address]



 (Auto)  2020          



   11:05pm          [place] [address]

 

 Basophils #  February  0.01    0.01-0.08    [facility], [address]



 (Auto)  2020          



   11:05pm          [facility] [address]

 

 Nucleated Red  February  0    0-0.2    [facility], [address]



 Blood Cells %  2020          



   11:05pm          [facility] [address]

 

 Nucleated Red  February  0    0    [facility], [address] 



 Blood Cells #  2020          



   11:05pm          [facility] [address]

 

 Venous Blood  January  SENT TO      TESTING  [facility], [address] 



 Collection  2020  [facility]      PERFORMED  



   10:49am        AT [facility].  [facility] [address]

 

 Rapid Plasma  February  NONREACTIVE    NONREACTIVE    [facility], [address]



 Reagin  2020          



   5:37am          [facility] [address]

 

 Hepatitis B  February  Negative    Negative  Performed  [facility] A# [id number], 
[address], [address]



 Surface  2020        at:  HD -  



 Antigen  5:37am        [facility]  [facility] [address]



           [id number]  



           [place], [place]  



            [id number]  



           ab  



           Director:  



           Todd Kidd MD, Phone:  



           [phone]  







Health Concerns

No known health concerns documented



Advance Directives







 Advance Directive  Response  Recorded Date/Time

 

 Advance Directives  No  2015 8:40pm

 

 Advance Directive on File  No  2020 12:00pm

 

 Directive to Physicians/Living Will  No  2015 8:40pm

 

 Health Care Proxy  No  2015 8:40pm

 

 Name of Surrogate/Decision Maker  NA  2020 11:22am

 

 Organ Donor  No  2015 8:40pm

 

 Medical Power of   No  2015 8:40pm

 

 Patient/Family Given Education Material R/T  No  2020 12:00pm



 Directives?    







Chief Complaint and Reason for Visit







 Chief Complaint  39 WK IUP , DESIRES INDUCTION







Encounters







 Encounter  Location(s)  Arrival/Admit Date  Discharge/Depart Date  Provider(s)

 

 Admitted  Asher  2020    FRANKIE JUAREZ



 Inpatient  [facility]  5:25am    A MD



   Ctr      

 

 Registered  Asher  2020    FRANKIE JUAREZ



 [facility]  10:15am    A MD



   Ctr      







Assessments

No Assessments Information Available



Functional Status

No Functional Status information available



Goals

No Goals Information Available



Immunizations

No Immunization Information Available



Mental Status

No Mental Status Information Available



Medical Equipment

No Medical Equipment Information available



Insurance Providers







 Guarantor  Kade Lacy

 

 Address  [address] [place] [address]

 

 Contact Info.  Home Phone:  [phone]











 Payer  Policy Id  Coverage Id  Subscriber's  Subscriber Id  Effective  
Expiration



       Name    Date  Date

 

 [employer]  [id number]    Regino  [id number]    



       Kade      

 

 [facility]  [id number]    Yadiel Daugherty  [phone]    



 [employer]            



 [employer]            







Plan of Treatment

Future Tests

Future scheduled test information is unavailable

Pending Tests

Pending diagnostic test information is unavailable

Future Visits

Future appointment information is unavailable

Referrals to Other Providers

Referral information is unavailable

Future Procedures

Future procedure information is unavailable

Future Medications

Future medication information is unavailable

Patient Instructions







 Home Care Instructions for Mom

 

 Postpartum Hemorrhage







Social History

Smoking Status







 Status  Date of Observation

 

 Never smoked tobacco (finding)  2020 12:00pm





Observation Status







 Observation  Response  Date of Response

 

 Hx Physical Abuse  No  2016 10:56am











 Assigned Birth Sex  Female







Vital Signs







 Vital Reading  Result  Collection Date/Time







Hospital Discharge Instructions



Additional Instructions



Instructions

Physician Documentation



Discharge Instructions

No strenuous activity for two weeks.

Take ibuprofen and/or hydrocodone as needed for pain.

May eat a regular diet.

May take a shower or tub bath once or twice a day.~ If stitches are present,

soak in warm water for five minutes twice a day.

Take prenatal vitamin and iron once a day.

Follow up with Dr. Juarez in three weeks.

Call the office if fever, heavy bleeding, or worsening pain occur.

Thank you,

Frankie Juarez M.D.

## 2020-02-22 NOTE — XMS REPORT
Encounter Summary

 Created on:2019



Patient:Urvashi Witt

Sex:Female

:1995

External Reference #:[id number]





Demographics







 Address  [address]



   [place] [address]

 

 Home Phone  [phone]

 

 Preferred Language  English

 

 Marital Status  Never 

 

 Jehovah's witness Affiliation  Unknown

 

 Race  White

 

 Ethnic Group   or /Sami









Author







Care Team Providers







 Name  Role  Phone

 

 Frankie Low  Ob/Gyn  [phone]

 

 Marilu Diaz  Nurse Practitioner  [phone]









Reason for Visit







 ob 28 week visit [facility]







Instructions







         1.  screening

 

              CBC w/ auto diff

 

              HIV (1+2) Ab screen, serum

 

              glucose tolerance test, 1-hour

 

              RPR (rapid plasma reagin), serum

 

              antibody screen, serum or plasma

 

         2. Routine  care

 

              urinalysis, dipstick

 

              US, obstetric, 3rd trimester

 

         3. Recurrent urinary tract infection

 

              culture, urine

 

         4. History of acute renal failure

 

         5. Abnormal glucose tolerance test during pregnancy - baby not yet 
delivered



 









 Discussion Note

 

      Discussed  labor symptoms and expected fetal movement. Encouraged



 adequate fluid intake and daily PNV and iron.



Patient educational handouts: No information available.



Plan of Care







 Reminders      Provider



       

 

      Appointments            3 Hour Gtt         Frankie Low,



     2019  MD



     8:30AM  

 

                  OB Sono         Frankie Low,



     2019  MD



     9:30AM  

 

                  OB Sono         Sonogram



     2019  



     9:30AM  

 

      Lab            Urinalysis,         In-House Results



   Dipstick  2019  



       

 

                  CBC W/ Auto         [facility]



   Diff  2019  [facility] (Lab)

 

                  HIV (1+2) Ab         [facility]



   Screen, Serum  2019  [facility] (Lab)

 

                  Glucose         In-House Results



   Tolerance Test, 1-Hour  2019  



       

 

                  RPR (Rapid         [facility]



   Plasma Reagin), Serum  2019  [facility] (Lab)

 

                  Antibody         [facility]



   Screen, Serum or Plasma  2019  [facility] (Lab)

 

                  Culture,         [facility]



   Urine  2019  [facility] (Labs) (Xray)



       

 

      Referral            None              



   recorded.    

 

      Procedures            None              



   recorded.    

 

      Surgeries            None              



   recorded.    

 

      Imaging            US,         In-House Results



   Obstetric, 3Rd  2019  



   Trimester    







Medications







 Name  Start Date    



       









 Bactrim  mg-160 mg tablet  



  Take 1 tablet twice a day by oral route for 5 days.  

 

 butalbital-acetaminophen-caffeine 50 mg-325 mg-40 mg tablet  



  TAKE ONE (1) TABLET(S) EVERY 4 HOURS BY MOUTH DAILY AS NEEDED.  

 

 clindamycin HCl 300 mg capsule  



  Take 1 capsule every 8 hours by oral route.  

 

 ferrous gluconate 240 mg (27 mg iron) tablet  



  Take 1 tablet every day by oral route.  

 

 Macrobid 100 mg capsule  



  Take 1 capsule twice a day by oral route for 5 days.  

 

 nitrofurantoin macrocrystal 50 mg capsule  



  Take 1 capsule every day by oral route in the evening.  

 

 Reglan 10 mg tablet  



  Take 1 tablet 4 times a day by oral route.  







Medications Administered

 None recorded.



Vitals







 Height  Weight  BMI  Blood Pressure

 

  5 ft 1 in   144 lbs   27.2 kg/m2   116/57 mm[Hg]







Results







 Lab Results

 

 









 Date  Name  Specimen  Result  Interpretation  Description  Value  Range  
Status  Address  









 2019  Glucose         Results  149      In-House



   Tolerance                Results:



   Test, 1-Hour                For



                   Internal



                   Use Only



                   

 

 2019  Urinalysis,         Leukocytes  Negative      In-House



   Dipstick                Results:



                   For



                   Internal



                   Use Only



                   

 

            Nitrite  negative      In-House



                   Results:



                   For



                   Internal



                   Use Only



                   

 

            Urobilinogen  .2      In-House



                   Results:



                   For



                   Internal



                   Use Only



                   

 

            Protein  30      In-House



                   Results:



                   For



                   Internal



                   Use Only



                   

 

            Ph  7.0      In-House



                   Results:



                   For



                   Internal



                   Use Only



                   

 

            Blood  Negative      In-House



                   Results:



                   For



                   Internal



                   Use Only



                   

 

            Specific  1.020      In-House



           Gravity        Results:



                   For



                   Internal



                   Use Only



                   

 

            Ketone  Negative      In-House



                   Results:



                   For



                   Internal



                   Use Only



                   

 

            Bilirubin  Negative      In-House



                   Results:



                   For



                   Internal



                   Use Only



                   

 

            Glucose  Negative      In-House



                   Results:



                   For



                   Internal



                   Use Only



                   

 

            Appearance  Clear      In-House



                   Results:



                   For



                   Internal



                   Use Only



                   

 

            Color  Yellow      In-House



                   Results:



                   For



                   Internal



                   Use Only



                   

 

 10/24/2019  Culture,   URINE       Urine Culture,  final    Final  [facility]



   Urine        Routine  report      [facility]: [address]



                   [address]



                   

 

      URINE       Result 1  no growth    Final  [facility]



                   PSC: [address]



                   [address]



                   

 

 10/24/2019  Urinalysis,         Leukocytes  Negative      In-House



   Dipstick                Results:



                   For



                   Internal



                   Use Only



                   

 

            Nitrite  negative      In-House



                   Results:



                   For



                   Internal



                   Use Only



                   

 

            Urobilinogen  .2      In-House



                   Results:



                   For



                   Internal



                   Use Only



                   

 

            Protein  Negative      In-House



                   Results:



                   For



                   Internal



                   Use Only



                   

 

            Ph  7.0      In-House



                   Results:



                   For



                   Internal



                   Use Only



                   

 

            Blood  Negative      In-House



                   Results:



                   For



                   Internal



                   Use Only



                   

 

            Specific  1.025      In-House



           Gravity        Results:



                   For



                   Internal



                   Use Only



                   

 

            Ketone  Negative      In-House



                   Results:



                   For



                   Internal



                   Use Only



                   

 

            Bilirubin  Negative      In-House



                   Results:



                   For



                   Internal



                   Use Only



                   

 

            Glucose  Negative      In-House



                   Results:



                   For



                   Internal



                   Use Only



                   

 

            Appearance  Clear      In-House



                   Results:



                   For



                   Internal



                   Use Only



                   

 

            Color  Yellow      In-House



                   Results:



                   For



                   Internal



                   Use Only



                   

 

   US,        No observation        In-House



   Obstetric,        recorded.        Results:



   3Rd                For



   Trimester                Internal



                   Use Only



                   







Allergies







 Code  Code System  Name  Reaction  Severity  Status  Onset



             









 NKDA        







Problems







 Name  Status  Onset Date  Source  



         









 Pregnant  Active  07/10/2019  

 

 Acute Cystitis in Pregnancy, Antepartum  Active  2019  

 

 Acute Cervicitis  Active  2019  

 

 History of Acute Renal Failure  Active  2019  

 

 Generalized Headache  Active  2019  

 

 Recurrent Urinary Tract Infection  Active    







Procedures







 Date  Name  Performed by  



       









 2019  US, Obstetric, 3Rd Trimester  In-House Results



     For Internal Use Only



     85888







Vaccine List

None recorded.



Social History







 Tobacco Smoking Status  Never Smoker  







Past Encounters







 2019



  Screening; Routine  Care; Recurrent Urinary Tract Infection
; History of Acute Renal Failure; Abnormal Glucose Tolerance Test During 
Pregnancy - Baby Not Yet Delivered



 Frankie Low MD: [address] [address], 
Ph. 

 

 10/24/2019



 Routine  Care; Recurrent Urinary Tract Infection



 Frankie Low MD: [address] [address], 
Ph. 







History of Present Illness

None recorded.



Review of Systems

None recorded.



Physical Exam

None recorded.

## 2020-02-22 NOTE — XMS REPORT
Encounter Summary

 Created on:2020



Patient:Urvashi Witt

Sex:Female

:1995

External Reference #:[id number]





Demographics







 Address  [address]



   [place] [address]

 

 Home Phone  [phone]

 

 Preferred Language  English

 

 Marital Status  Never 

 

 Sabianism Affiliation  Unknown

 

 Race  White

 

 Ethnic Group   or /Arabic









Author







Care Team Providers







 Name  Role  Phone

 

 Frankie Low  Ob/Gyn  [phone]

 

 Marilu Diaz  Nurse Practitioner  [phone]









Reason for Visit







 ob 36 week visit







Instructions







         1. Routine  care

 

              urinalysis, dipstick

 

              US, obstetric, limited

 

         2. Exposure to Influenzavirus

 

              Tamiflu 75 mg capsule

 

         3. Recurrent urinary tract infection

 

              Macrobid 100 mg capsule

 

         4. History of acute renal failure









 Discussion Note

 

      Advised patient to monitor fetal movement and report if decreased. Labor



 symptoms reviewed. Take PNV and iron daily if tolerated. Asked her to call if 
she has



 symptoms of ruptured membranes or persistent regular contractions.



Patient educational handouts: No information available.



Plan of Care







 Reminders      Provider



       

 

      Appointments            OB Follow up         Frankie Low,



     2020  MD



     10:15AM  

 

      Lab            Urinalysis,         In-House Results



   Dipstick  2020  



       

 

      Referral            None              



   recorded.    

 

      Procedures            None              



   recorded.    

 

      Surgeries            None              



   recorded.    

 

      Imaging            US,         In-House Results



   Obstetric, Limited  2020  



       







Medications







 Name  Start Date    



       









 Bactrim  mg-160 mg tablet  



  Take 1 tablet twice a day by oral route for 5 days.  

 

 butalbital-acetaminophen-caffeine 50 mg-325 mg-40 mg tablet  



  TAKE ONE (1) TABLET(S) EVERY 4 HOURS BY MOUTH DAILY AS NEEDED.  

 

 ferrous gluconate 240 mg (27 mg iron) tablet  



  Take 1 tablet every day by oral route.  

 

 Macrobid 100 mg capsule  



  Take 1 capsule every day by oral route.  

 

 nitrofurantoin macrocrystal 50 mg capsule  



  Take 1 capsule every day by oral route in the evening.  

 

 Reglan 10 mg tablet  



  Take 1 tablet 4 times a day by oral route.  

 

 Tamiflu 75 mg capsule  



  Take 1 capsule twice a day by oral route for 5 days.  







Medications Administered

 None recorded.



Vitals







 Height  Weight  BMI  Blood Pressure

 

  5 ft 1 in   151 lbs   28.5 kg/m2   129/74 mm[Hg]







Results







 Lab Results

 

 









 Date  Name  Specimen  Result  Interpretation  Description  Value  Range  
Status  Address  









 2020  Urinalysis,         Leukocytes  Small      In-House



   Dipstick                Results:



                   For



                   Internal



                   Use Only



                   

 

            Nitrite  negative      In-House



                   Results:



                   For



                   Internal



                   Use Only



                   

 

            Urobilinogen  .2      In-House



                   Results:



                   For



                   Internal



                   Use Only



                   

 

            Protein  Trace      In-House



                   Results:



                   For



                   Internal



                   Use Only



                   

 

            Ph  7.0      In-House



                   Results:



                   For



                   Internal



                   Use Only



                   

 

            Blood  Negative      In-House



                   Results:



                   For



                   Internal



                   Use Only



                   

 

            Specific  1.025      In-House



           Gravity        Results:



                   For



                   Internal



                   Use Only



                   

 

            Ketone  Negative      In-House



                   Results:



                   For



                   Internal



                   Use Only



                   

 

            Bilirubin  Negative      In-House



                   Results:



                   For



                   Internal



                   Use Only



                   

 

            Glucose  Negative      In-House



                   Results:



                   For



                   Internal



                   Use Only



                   

 

            Appearance  Clear      In-House



                   Results:



                   For



                   Internal



                   Use Only



                   

 

            Color  Yellow      In-House



                   Results:



                   For



                   Internal



                   Use Only



                   

 

 2020  Streptococcus  Genital      No observation        [facility]



   Group B,  vaginal      recorded.        [facility]: [address]207



   Culture,                BEE[facility] Garciaified                ,



   Specimen                [place]



                   

 

 2020  Culture, Urine        No observation        [facility]



           recorded.        [facility]: [address]



                   [address],



                   [place]



                   

 

 2020  Urinalysis,         Leukocytes  Trace      In-House



   Dipstick                Results:



                   For



                   Internal



                   Use Only



                   

 

            Nitrite  negative      In-House



                   Results:



                   For



                   Internal



                   Use Only



                   

 

            Urobilinogen  .2      In-House



                   Results:



                   For



                   Internal



                   Use Only



                   

 

            Protein  Trace      In-House



                   Results:



                   For



                   Internal



                   Use Only



                   

 

            Ph  7.0      In-House



                   Results:



                   For



                   Internal



                   Use Only



                   

 

            Blood  Negative      In-House



                   Results:



                   For



                   Internal



                   Use Only



                   

 

            Specific  1.020      In-House



           Gravity        Results:



                   For



                   Internal



                   Use Only



                   

 

            Ketone  Negative      In-House



                   Results:



                   For



                   Internal



                   Use Only



                   

 

            Bilirubin  Negative      In-House



                   Results:



                   For



                   Internal



                   Use Only



                   

 

            Glucose  Negative      In-House



                   Results:



                   For



                   Internal



                   Use Only



                   

 

            Appearance  Clear      In-House



                   Results:



                   For



                   Internal



                   Use Only



                   

 

            Color  Yellow      In-House



                   Results:



                   For



                   Internal



                   Use Only



                   

 

 2019  Urinalysis,         Leukocytes  Small      In-House



   Dipstick                Results:



                   For



                   Internal



                   Use Only



                   

 

            Nitrite  negative      In-House



                   Results:



                   For



                   Internal



                   Use Only



                   

 

            Urobilinogen  .2      In-House



                   Results:



                   For



                   Internal



                   Use Only



                   

 

            Protein  30      In-House



                   Results:



                   For



                   Internal



                   Use Only



                   

 

            Ph  7.0      In-House



                   Results:



                   For



                   Internal



                   Use Only



                   

 

            Blood  Non-Hemoly      In-House



             zed: Trace      Results:



                   For



                   Internal



                   Use Only



                   

 

            Specific  1.025      In-House



           Gravity        Results:



                   For



                   Internal



                   Use Only



                   

 

            Ketone  Negative      In-House



                   Results:



                   For



                   Internal



                   Use Only



                   

 

            Bilirubin  Negative      In-House



                   Results:



                   For



                   Internal



                   Use Only



                   

 

            Glucose  Negative      In-House



                   Results:



                   For



                   Internal



                   Use Only



                   

 

            Appearance  Clear      In-House



                   Results:



                   For



                   Internal



                   Use Only



                   

 

            Color  Yellow      In-House



                   Results:



                   For



                   Internal



                   Use Only



                   

 

   US, Obstetric,        No observation        In-House



   Limited        recorded.        Results:



                   For



                   Internal



                   Use Only



                   

 

   US, Obstetric,        No observation        In-House



   Limited        recorded.        Results:



                   For



                   Internal



                   Use Only



                   







Allergies







 Code  Code System  Name  Reaction  Severity  Status  Onset



             









 NKDA        







Problems







 Name  Status  Onset Date  Source  



         









 Pregnant  Active  07/10/2019  

 

 Acute Cystitis in Pregnancy, Antepartum  Active  2019  

 

 Acute Cervicitis  Active  2019  

 

 History of Acute Renal Failure  Active  2019  

 

 Generalized Headache  Active  2019  

 

 Recurrent Urinary Tract Infection  Active    







Procedures







 Date  Name  Performed by  



       









 2019  US, Obstetric, Limited  In-House Results



     For Internal Use Only



     [address]854

 

 2020  US, Obstetric, Limited  In-House Results



     For Internal Use Only



     11100







Vaccine List

None recorded.



Social History







 Tobacco Smoking Status  Never Smoker  







Past Encounters







 2020



 Routine  Care; Exposure to Influenzavirus; Recurrent Urinary Tract 
Infection; History of Acute Renal Failure



 Frankie Low MD: [address] [place] [phone], 
Ph. 

 

 2020



  Screening; Recurrent Urinary Tract Infection; History of Acute Renal 
Failure



 Frankie Low MD: [address] [place] [phone], 
Ph. 

 

 2019



 Routine  Care; Recurrent Urinary Tract Infection



 Frankie Low MD: [address] [place] [phone], 
Ph. 







History of Present Illness

None recorded.



Review of Systems

None recorded.



Physical Exam

None recorded.

## 2020-02-22 NOTE — XMS REPORT
Encounter Summary

 Created on:2019



Patient:Urvashi Witt

Sex:Female

:1995

External Reference #:[id number]





Demographics







 Address  [address]



   [place] [address]

 

 Home Phone  [phone]

 

 Preferred Language  English

 

 Marital Status  Never 

 

 Mandaeism Affiliation  Unknown

 

 Race  White

 

 Ethnic Group   or /Divehi









Author







Care Team Providers







 Name  Role  Phone

 

 Frankie Low  Ob/Gyn  [phone]

 

 Marilu Diaz  Nurse Practitioner  [phone]









Reason for Visit







 ob 32 week visit







Instructions







         1. Routine  care

 

              US, obstetric, limited

 

              urinalysis, dipstick

 

         2. Recurrent urinary tract infection

 

              culture, urine

 

              Bactrim  mg-160 mg tablet









 Discussion Note

 

      Discussed ultrasound findings. Discussed  labor symptoms and 
expected



 fetal movement. Encouraged adequate fluid intake and daily PNV and iron.



Patient educational handouts: No information available.



Plan of Care







 Reminders      Provider



       

 

      Appointments            OB Follow up         Frankie Low MD



     2020  



     2:30PM  

 

      Lab            Urinalysis,         In-House Results



   Dipstick  2019  



       

 

                  Culture,         [facility]



   Urine  2019  [facility]



       (Xray)

 

      Referral            None              



   recorded.    

 

      Procedures            None              



   recorded.    

 

      Surgeries            None              



   recorded.    

 

      Imaging            US,         In-House Results



   Obstetric, Limited  2019  



       







Medications







 Name  Start Date    



       









 Bactrim  mg-160 mg tablet  



  Take 1 tablet twice a day by oral route for 5 days.  

 

 butalbital-acetaminophen-caffeine 50 mg-325 mg-40 mg tablet  



  TAKE ONE (1) TABLET(S) EVERY 4 HOURS BY MOUTH DAILY AS NEEDED.  

 

 clindamycin HCl 300 mg capsule  



  Take 1 capsule every 8 hours by oral route.  

 

 ferrous gluconate 240 mg (27 mg iron) tablet  



  Take 1 tablet every day by oral route.  

 

 Macrobid 100 mg capsule  



  Take 1 capsule twice a day by oral route for 5 days.  

 

 nitrofurantoin macrocrystal 50 mg capsule  



  Take 1 capsule every day by oral route in the evening.  

 

 Reglan 10 mg tablet  



  Take 1 tablet 4 times a day by oral route.  







Medications Administered

 None recorded.



Vitals







 Height  Weight  BMI  Blood Pressure

 

  5 ft 1 in   148.8 lbs   28.1 kg/m2   109/67 mm[Hg]







Results







 Lab Results

 

 









 Date  Name  Specimen  Result  Interpretation  Description  Value  Range  
Status  Address  









 2019  Urinalysis,         Leukocytes  Small      In-House



   Dipstick                Results:



                   For



                   Internal



                   Use Only



                   

 

            Nitrite  negative      In-House



                   Results:



                   For



                   Internal



                   Use Only



                   

 

            Urobilinogen  .2      In-House



                   Results:



                   For



                   Internal



                   Use Only



                   

 

            Protein  30      In-House



                   Results:



                   For



                   Internal



                   Use Only



                   

 

            Ph  7.0      In-House



                   Results:



                   For



                   Internal



                   Use Only



                   

 

            Blood  Non-Hemoly      In-House



             zed: Trace      Results:



                   For



                   Internal



                   Use Only



                   

 

            Specific  1.025      In-House



           Gravity        Results:



                   For



                   Internal



                   Use Only



                   

 

            Ketone  Negative      In-House



                   Results:



                   For



                   Internal



                   Use Only



                   

 

            Bilirubin  Negative      In-House



                   Results:



                   For



                   Internal



                   Use Only



                   

 

            Glucose  Negative      In-House



                   Results:



                   For



                   Internal



                   Use Only



                   

 

            Appearance  Clear      In-House



                   Results:



                   For



                   Internal



                   Use Only



                   

 

            Color  Yellow      In-House



                   Results:



                   For



                   Internal



                   Use Only



                   

 

 2019  Glucose  Blood       Results  passed      In-House



   Tolerance  capillary              Results:



   Test, 3-Hour                For



                   Internal



                   Use Only



                   

 

 2019  Glucose         Results  149      In-House



   Tolerance                Results:



   Test, 1-Hour                For



                   Internal



                   Use Only



                   

 

 2019  Urinalysis,         Leukocytes  Negative      In-House



   Dipstick                Results:



                   For



                   Internal



                   Use Only



                   

 

            Nitrite  negative      In-House



                   Results:



                   For



                   Internal



                   Use Only



                   

 

            Urobilinogen  .2      In-House



                   Results:



                   For



                   Internal



                   Use Only



                   

 

            Protein  30      In-House



                   Results:



                   For



                   Internal



                   Use Only



                   

 

            Ph  7.0      In-House



                   Results:



                   For



                   Internal



                   Use Only



                   

 

            Blood  Negative      In-House



                   Results:



                   For



                   Internal



                   Use Only



                   

 

            Specific  1.020      In-House



           Gravity        Results:



                   For



                   Internal



                   Use Only



                   

 

            Ketone  Negative      In-House



                   Results:



                   For



                   Internal



                   Use Only



                   

 

            Bilirubin  Negative      In-House



                   Results:



                   For



                   Internal



                   Use Only



                   

 

            Glucose  Negative      In-House



                   Results:



                   For



                   Internal



                   Use Only



                   

 

            Appearance  Clear      In-House



                   Results:



                   For



                   Internal



                   Use Only



                   

 

            Color  Yellow      In-House



                   Results:



                   For



                   Internal



                   Use Only



                   

 

   US, Obstetric,        No observation        In-House



   Limited        recorded.        Results:



                   For



                   Internal



                   Use Only



                   

 

   US, Obstetric,        No observation        In-House



   3Rd Trimester        recorded.        Results:



                   For



                   Internal



                   Use Only



                   







Allergies







 Code  Code System  Name  Reaction  Severity  Status  Onset



             









 NKDA        







Problems







 Name  Status  Onset Date  Source  



         









 Pregnant  Active  07/10/2019  

 

 Acute Cystitis in Pregnancy, Antepartum  Active  2019  

 

 Acute Cervicitis  Active  2019  

 

 History of Acute Renal Failure  Active  2019  

 

 Generalized Headache  Active  2019  

 

 Recurrent Urinary Tract Infection  Active    







Procedures







 Date  Name  Performed by  



       









 2019  US, Obstetric, 3Rd Trimester  In-House Results



     For Internal Use Only



     08072

 

 2019  US, Obstetric, Limited  In-House Results



     For Internal Use Only



     [id number]







Vaccine List

None recorded.



Social History







 Tobacco Smoking Status  Never Smoker  







Past Encounters







 2019



 Routine  Care; Recurrent Urinary Tract Infection



 Frankie Low MD: [address] [address], 
Ph. 

 

 2019



 Abnormal Glucose Tolerance Test During Pregnancy - Baby Not Yet Delivered



 Frankie Low MD: [address] [address], 
Ph. 

 

 2019



  Screening; Routine  Care; Recurrent Urinary Tract Infection
; History of Acute Renal Failure; Abnormal Glucose Tolerance Test During 
Pregnancy - Baby Not Yet Delivered



 Frankie Low MD: [address] [address], 
Ph. 







History of Present Illness

None recorded.



Review of Systems

None recorded.



Physical Exam

None recorded.

## 2020-02-22 NOTE — EDPHYS
Physician Documentation                                                                           

 [facility]                                                                 

Name: Urvashi Witt                                                                                  

Age: 24 yrs                                                                                       

Sex: Female                                                                                       

: 1995                                                                                   

MRN: [id number]                                                                                   

Arrival Date: 2020                                                                          

Time: 06:13                                                                                       

Account#: [id number]                                                                            

Bed 20                                                                                            

Private MD:                                                                                       

ED Physician Lamont Luu                                                                      

HPI:                                                                                              

                                                                                             

06:32 This 24 yrs old  Female presents to ER via Ambulatory with complaints of Fever, jmm 

      Chills, Ear Pain.                                                                           

06:32 Onset: The symptoms/episode began/occurred last night. Modifying factors: there are no  jmm 

      obvious modifying factors. Associated signs and symptoms: Pertinent positives:              

      backache, chills, Pertinent negatives: sore throat, vomiting. This is a 24 year old         

      female with no chronic medical conditions that presents to the ED with complaints of        

      fever, chills, body aches beginning this past evening. Patient is s/p vaginal delivery      

      on 2020. Denies cough. Patient also complains of upper back pain and breast             

      tenderness. .                                                                               

                                                                                                  

OB/GYN:                                                                                           

06:31 LMP N/A - Recent birth                                                                  jd3 

                                                                                                  

Historical:                                                                                       

- Allergies:                                                                                      

06:31 No Known Allergies;                                                                     jd3 

- Home Meds:                                                                                      

06:31 None [Active];                                                                          jd3 

- PMHx:                                                                                           

06:31 None;                                                                                   jd3 

- PSHx:                                                                                           

06:31 None;                                                                                   jd3 

                                                                                                  

- Immunization history:: Adult Immunizations up to date.                                          

- Coronavirus screen:: The patient has NOT traveled to [place] in the past 14 days. The             

  patient has NOT had contact with known/suspected case of Coronavirus? Proceed with              

  normal triage procedures.                                                                       

- Social history:: Smoking status: Patient denies any tobacco usage or history of.                

- Ebola Screening: : Patient negative for fever greater than or equal to 101.5 degrees            

  Fahrenheit, and additional compatible Ebola Virus Disease symptoms.                             

                                                                                                  

                                                                                                  

ROS:                                                                                              

06:32 Constitutional: Positive for body aches, fever.                                         jmm 

06:32 ENT: Positive for ear pain, Negative for sore throat.                                       

06:32 Respiratory: Negative for cough.                                                            

06:32 Abdomen/GI: Positive for diarrhea, Negative for abdominal pain, vomiting.                   

06:32 : Negative for injury or acute deformity.                                                 

06:32 All other systems are negative.                                                             

                                                                                                  

Exam:                                                                                             

06:32 Constitutional:  This is a well developed, well nourished patient who is awake, alert,  jmm 

      and in no acute distress. Head/Face:  atraumatic. Eyes:  EOMI, no conjunctival erythema     

      appreciated ENT:  Moist Mucus Membranes Neck:  Trachea midline, Supple Chest/axilla:        

      Normal chest wall appearance and motion.                                                    

06:32 Cardiovascular: Rate: normal, Rhythm: regular, Pulses: no pulse deficits are                

      appreciated.                                                                                

06:32 Respiratory: the patient does not display signs of respiratory distress,  Respirations:     

      normal, Breath sounds: are clear throughout.                                                

06:32 Abdomen/GI: Inspection: abdomen appears normal, Palpation: abdomen is soft and              

      non-tender, in all quadrants.                                                               

06:32 Back: ROM is normal.                                                                        

06:32 Musculoskeletal/extremity: ROM: intact in all extremities.                                  

06:32 Skin: Appearance: Color: normal in color.                                                   

06:32 Neuro: Orientation: is normal, Mentation: is normal, Memory: is normal.                     

06:32 Psych: Behavior/mood is pleasant, cooperative.                                              

07:06 Chest/axilla: Palpation: is normal, Breasts: cellulitis, is not appreciated, nipple     jm 

      discharge, of the right breast, of the left breast, that is milky, rash, is not             

      appreciated.                                                                                

07:06 Back: CVA tenderness, that is moderate, is noted on the right, no midline tenderness.       

                                                                                                  

Vital Signs:                                                                                      

06:31  / 76; Pulse 114; Resp 17 S; Temp 101.5(O); Pulse Ox 99% on R/A; Weight 63.5 kg   jd3 

      (R); Height 5 ft. 1 in. (154.94 cm) (R); Pain 9/10;                                         

07:20  / 66; Pulse 79; Resp 16; Pulse Ox 98% on R/A;                                    rb1 

07:40 Temp 99.2(O);                                                                           rb1 

08:20  / 59; Pulse 71; Resp 17; Temp 98.4(O); Pulse Ox 99% on R/A;                      rb1 

06:31 Body Mass Index 26.45 (63.50 kg, 154.94 cm)                                             jd3 

                                                                                                  

MDM:                                                                                              

06:15 Patient medically screened.                                                             terrence 

08:42 Data reviewed: vital signs, nurses notes. Counseling: I had a detailed discussion with  aster 

      the patient and/or guardian regarding: the historical points, exam findings, and any        

      diagnostic results supporting the discharge/admit diagnosis, lab results, radiology         

      results, the need for outpatient follow up. ED course: Patient's PE concerning for          

      pyelonephritis. Will treat. Patient's abdomen is soft. Patient has no abnormal              

      discharge, no pelvic pain, I do not currently suspect endometritis. Patient is advised      

      to follow up with her OB for reevaluation and otherwise given strict return                 

      precautions. patient understood and agrees with the plan of care. .                         

                                                                                                  

                                                                                             

06:24 Order name: CBC with Diff                                                               [facility] 

                                                                                             

06:24 Order name: CMP                                                                         [facility] 

                                                                                             

06:25 Order name: Flu                                                                         [facility] 

                                                                                             

06:25 Order name: Strep                                                                       [facility] 

                                                                                             

06:25 Order name: Procalcitonin                                                               [facility] 

                                                                                             

06:25 Order name: Lactate                                                                     [facility] 

                                                                                             

06:25 Order name: Blood Culture Adult (2)                                                     [facility] 

                                                                                             

06:30 Order name: Urine Microscopic Only                                                      [facility] 

                                                                                             

07:01 Order name: CBC with Automated Diff                                                     [facility]

                                                                                             

07:14 Order name: Comprehensive Metabolic Panel; Complete Time: 07:23                         [facility]

                                                                                             

07:14 Order name: Lactate; Complete Time: 07:23                                               [facility]

                                                                                             

07:16 Order name: Group A Streptococcus Rapid Sc; Complete Time: 07:23                        [facility]

                                                                                             

07:17 Order name: Influenza Screen (A ; Complete Time: 07:23                                  [facility]

                                                                                             

07:26 Order name: Urine Dipstick--Ancillary (enter results)                                   [facility]  

                                                                                             

06:25 Order name: Saline Lock; Complete Time: 06:53                                           [facility] 

                                                                                             

06:25 Order name: Urine Dipstick-Ancillary (obtain specimen); Complete Time: 07:24            [facility] 

                                                                                             

06:29 Order name: Gown patient; Complete Time: 06:37                                          [facility] 

                                                                                             

07:26 Order name: Urine Pregnancy--Ancillary (enter results)                                  [facility]  

                                                                                             

07:30 Order name: Procalcitonin; Complete Time: 07:33                                         [facility]

                                                                                             

07:30 Order name: Urine Pregnancy--Ancillary; Complete Time: 07:33                            [facility]

                                                                                             

07:30 Order name: Urine Dipstick-Ancillary; Complete Time: 07:33                              [facility]

                                                                                             

07:36 Order name: Urine Microscopic Only; Complete Time: 07:42                                [facility]

                                                                                             

07:43 Order name: CT Abd/Pelvis - IV Contrast Only                                            [facility] 

                                                                                             

08:27 Order name: CT; Complete Time: 08:29                                                    [facility]

                                                                                                  

Administered Medications:                                                                         

06:35 Drug: Ibuprofen 600 mg Route: PO;                                                       rr5 

06:53 Drug: NS 0.9% 1000 ml Route: IV; Rate: 1 bolus; Site: right antecubital;                jd3 

08:53 Drug: Rocephin 1 grams Route: IV; Rate: calculated rate; Site: right antecubital;       rb1 

09:06 Follow up: Response: No adverse reaction; IV Status: Completed infusion                 rb1 

                                                                                                  

                                                                                                  

Disposition:                                                                                      

                                                                                             

00:02 Co-signature as Attending Physician, Lamont Luu MD I agree with the assessment and  [facility] 

      plan of care.                                                                               

                                                                                                  

Disposition:                                                                                      

20 08:44 Discharged to Home. Impression: Urinary tract infection, site not specified,       

  Diarrhea, unspecified, Fever, unspecified.                                                      

- Condition is Stable.                                                                            

- Discharge Instructions: Food Choices to Help Relieve Diarrhea, Adult, Pyelonephritis,           

  Adult.                                                                                          

- Prescriptions for Cephalexin 500 mg Oral Capsule - take 1 capsule by ORAL route every           

  12 hours for 10 days; 20 capsule.                                                               

- Medication Reconciliation Form, Thank You Letter, Antibiotic Education, Prescription            

  Opioid Use form.                                                                                

- Follow up: Private Physician; When: 2 - 3 days; Reason: Recheck today's complaints,             

  Continuance of care, Re-evaluation by your physician.                                           

                                                                                                  

                                                                                                  

                                                                                                  

Signatures:                                                                                       

Dispatcher [employer]                           [facility]                                                 

Lamont Luu MD MD cha Mickail, Joel, PA PA   [facility]                                                  

María Mascorro, RN                     RN   rb1                                                  

Zohaib Hdez RN                    RN   jd3                                                  

Mikhail Casillas RN                      RN   rr5                                                  

                                                                                                  

Corrections: (The following items were deleted from the chart)                                    

                                                                                             

08:10 06:32 Abdomen/GI: Negative for abdominal pain, nausea and vomiting, diarrhea, aster       [facility] 

09:14 08:44 2020 08:44 Discharged to Home. Impression: Urinary tract infection, site    rb1 

      not specified; Diarrhea, unspecified; Fever, unspecified. Condition is Stable. Forms        

      are Medication Reconciliation Form, Thank You Letter, Antibiotic Education,                 

      Prescription Opioid Use. Follow up: Private Physician; When: 2 - 3 days; Reason:            

      Recheck today's complaints, Continuance of care, Re-evaluation by your physician. aster       

                                                                                                  

**************************************************************************************************

## 2020-02-22 NOTE — XMS REPORT
Encounter Summary

 Created on:2020



Patient:Urvashi Witt

Sex:Female

:1995

External Reference #:[id number]





Demographics







 Address  [address]



   [place] [address]

 

 Home Phone  [phone]

 

 Preferred Language  English

 

 Marital Status  Never 

 

 [employer] Affiliation  Unknown

 

 Race  White

 

 Ethnic Group   or /Indonesian









Author







Care Team Providers







 Name  Role  Phone

 

 Frankie Low  Ob/Gyn  [phone]

 

 Marilu Diaz  Nurse Practitioner  [phone]









Reason for Visit







 ob 37 week visit







Instructions







         1. Routine  care

 

              urinalysis, dipstick









 Discussion Note

 

      Advised patient to monitor fetal movement and report if decreased. Labor



 symptoms reviewed. Take PNV and iron daily if tolerated. Asked her to call if 
she has



 symptoms of ruptured membranes or persistent regular contractions.



Patient educational handouts: No information available.



Plan of Care







 Reminders      Provider



       

 

      Appointments            OB Follow up         Frankie Low,



     2020  MD



     10:30AM  

 

      Lab            Urinalysis,         In-House Results



   Dipstick  2020  



       

 

      Referral            None              



   recorded.    

 

      Procedures            None              



   recorded.    

 

      Surgeries            None              



   recorded.    

 

      Imaging            None              



   recorded.    







Medications







 Name  Start Date    



       









 Bactrim  mg-160 mg tablet  



  Take 1 tablet twice a day by oral route for 5 days.  

 

 butalbital-acetaminophen-caffeine 50 mg-325 mg-40 mg tablet  



  TAKE ONE (1) TABLET(S) EVERY 4 HOURS BY MOUTH DAILY AS NEEDED.  

 

 ferrous gluconate 240 mg (27 mg iron) tablet  



  Take 1 tablet every day by oral route.  

 

 nitrofurantoin macrocrystal 50 mg capsule  



  Take 1 capsule every day by oral route in the evening.  

 

 nitrofurantoin monohydrate/macrocrystals 100 mg capsule  



  Take 1 capsule every day by oral route.  

 

 oseltamivir 75 mg capsule  



  Take 1 capsule twice a day by oral route for 5 days.  

 

 Reglan 10 mg tablet  



  Take 1 tablet 4 times a day by oral route.  







Medications Administered

 None recorded.



Vitals







 Height  Weight  BMI  Blood Pressure

 

  5 ft 1 in   151.7 lbs   28.7 kg/m2   114/70 mm[Hg]







Results







 Lab Results

 

 









 Date  Name  Specimen  Result  Interpretation  Description  Value  Range  
Status  Address  









 2020  Urinalysis,         Leukocytes  Small      In-House



   Dipstick                Results:



                   For



                   Internal



                   Use Only



                   

 

            Nitrite  negative      In-House



                   Results:



                   For



                   Internal



                   Use Only



                   

 

            Urobilinogen  .2      In-House



                   Results:



                   For



                   Internal



                   Use Only



                   

 

            Protein  Trace      In-House



                   Results:



                   For



                   Internal



                   Use Only



                   

 

            Ph  7.0      In-House



                   Results:



                   For



                   Internal



                   Use Only



                   

 

            Blood  Negative      In-House



                   Results:



                   For



                   Internal



                   Use Only



                   

 

            Specific  1.025      In-House



           Gravity        Results:



                   For



                   Internal



                   Use Only



                   

 

            Ketone  Negative      In-House



                   Results:



                   For



                   Internal



                   Use Only



                   

 

            Bilirubin  Negative      In-House



                   Results:



                   For



                   Internal



                   Use Only



                   

 

            Glucose  Negative      In-House



                   Results:



                   For



                   Internal



                   Use Only



                   

 

            Appearance  Clear      In-House



                   Results:



                   For



                   Internal



                   Use Only



                   

 

            Color  Yellow      In-House



                   Results:



                   For



                   Internal



                   Use Only



                   

 

 2020  Streptococcus  Genital      No observation        [facility]



   Group B,  vaginal      recorded.        [facility]: 7207



   Culture,                N Jie Garciaified                ,



   Specimen                [facility]



                   

 

 2020  Culture, Urine        No observation        [facility]



           recorded.        [facility]: [address]



                   [address],



                   [place]



                   

 

 2020  Urinalysis,         Leukocytes  Trace      In-House



   Dipstick                Results:



                   For



                   Internal



                   Use Only



                   

 

            Nitrite  negative      In-House



                   Results:



                   For



                   Internal



                   Use Only



                   

 

            Urobilinogen  .2      In-House



                   Results:



                   For



                   Internal



                   Use Only



                   

 

            Protein  Trace      In-House



                   Results:



                   For



                   Internal



                   Use Only



                   

 

            Ph  7.0      In-House



                   Results:



                   For



                   Internal



                   Use Only



                   

 

            Blood  Negative      In-House



                   Results:



                   For



                   Internal



                   Use Only



                   

 

            Specific  1.020      In-House



           Gravity        Results:



                   For



                   Internal



                   Use Only



                   

 

            Ketone  Negative      In-House



                   Results:



                   For



                   Internal



                   Use Only



                   

 

            Bilirubin  Negative      In-House



                   Results:



                   For



                   Internal



                   Use Only



                   

 

            Glucose  Negative      In-House



                   Results:



                   For



                   Internal



                   Use Only



                   

 

            Appearance  Clear      In-House



                   Results:



                   For



                   Internal



                   Use Only



                   

 

            Color  Yellow      In-House



                   Results:



                   For



                   Internal



                   Use Only



                   

 

   Urinalysis,         Leukocytes  Trace      In-House



   Dipstick                Results:



                   For



                   Internal



                   Use Only



                   

 

            Nitrite  negative      In-House



                   Results:



                   For



                   Internal



                   Use Only



                   

 

            Urobilinogen  .2      In-House



                   Results:



                   For



                   Internal



                   Use Only



                   

 

            Protein  30      In-House



                   Results:



                   For



                   Internal



                   Use Only



                   

 

            Ph  7.0      In-House



                   Results:



                   For



                   Internal



                   Use Only



                   

 

            Blood  Non-Hemoly      In-House



             zed: Trace      Results:



                   For



                   Internal



                   Use Only



                   

 

            Specific  1.020      In-House



           Gravity        Results:



                   For



                   Internal



                   Use Only



                   

 

            Ketone  Negative      In-House



                   Results:



                   For



                   Internal



                   Use Only



                   

 

            Bilirubin  Negative      In-House



                   Results:



                   For



                   Internal



                   Use Only



                   

 

            Glucose  Negative      In-House



                   Results:



                   For



                   Internal



                   Use Only



                   

 

            Appearance  Clear      In-House



                   Results:



                   For



                   Internal



                   Use Only



                   

 

            Color  Dark      In-House



             Yellow      Results:



                   For



                   Internal



                   Use Only



                   

 

   US, Obstetric,        No observation        In-House



   Limited        recorded.        Results:



                   For



                   Internal



                   Use Only



                   







Allergies







 Code  Code System  Name  Reaction  Severity  Status  Onset



             









 NKDA        







Problems







 Name  Status  Onset Date  Source  



         









 Pregnant  Active  07/10/2019  

 

 Acute Cystitis in Pregnancy, Antepartum  Active  2019  

 

 Acute Cervicitis  Active  2019  

 

 History of Acute Renal Failure  Active  2019  

 

 Generalized Headache  Active  2019  

 

 Recurrent Urinary Tract Infection  Active    







Procedures







 Date  Name  Performed by  



       









 2020  US, Obstetric, Limited  In-House Results



     For Internal Use Only



     07992







Vaccine List

None recorded.



Social History







 Tobacco Smoking Status  Never Smoker  







Past Encounters







 2020



 Routine  Care



 Frankie Low MD: [address] [place] [phone], 
Ph. 

 

 2020



 Routine  Care; Exposure to Influenzavirus; Recurrent Urinary Tract 
Infection; History of Acute Renal Failure



 Frankie Low MD: [address] [place] [phone], 
Ph. 

 

 2020



  Screening; Recurrent Urinary Tract Infection; History of Acute Renal 
Failure



 Frankie Low MD: [address] [place] [phone], 
Ph. 







History of Present Illness

None recorded.



Review of Systems

None recorded.



Physical Exam

None recorded.

## 2020-02-22 NOTE — RAD REPORT
EXAM DESCRIPTION:  CT - Abdomen   Pelvis W Contrast - 2/22/2020 8:07 am

 

CLINICAL HISTORY:  diarrhea, fever, postpartum from February 6th.

 

COMPARISON:  Abdomen   Pelvis W Contrast dated 11/25/2018

 

TECHNIQUE:  Biphasic, helical CT imaging of the abdomen and pelvis was performed following 100 ml non
-ionic IV contrast.

 

No oral contrast given.

 

All CT scans are performed using dose optimization technique as appropriate and may include automated
 exposure control or mA/KV adjustment according to patient size.

 

FINDINGS:  No suspicious findings in the lung bases.

 

The liver, spleen, and pancreas show no suspicious findings. Gallbladder and biliary tree are also wi
thout suspicious finding. Gallstones can be occult on CT imaging. No finding to suspect an active gal
lbladder process.

 

Renal function is generally symmetric. The patient does have nodular contour to the kidneys which may
 be baseline for the patient. Cortical scarring from prior renal infections would be possible but wou
ld need correlation with history. There is some minimal subtle heterogeneity of parenchymal enhanceme
nt. No renal solid mass lesion. Pyelonephritis is not entirely excluded but needs correlation with cl
inical history and UA findings. No hydronephrosis or obstructing calculus. Urinary bladder is fully c
ontracted limiting assessment. No adrenal abnormalities.

 

The uterus remains enlarged, not unexpected for postpartum status. Vigorously enhancing vasculature w
ithin and around the uterus also an expected finding. No ovarian abnormality seen. No abnormal air co
llection within the endometrial cavity. Endometrial hypodensity is not unexpected. CT imaging alone c
annot exclude endometritis. No gonadal vein thrombosis.

 

No dilated bowel loops or bowel wall thickening. Appendix is not clearly defined. No direct or indire
ct evidence for appendicitis. No free air, free fluid or inflammatory stranding.  No hernia, mass or 
bulky lymphadenopathy.

 

No suspicious bony findings.

 

 

IMPRESSION:  Uterus is as expected for recent postpartum status.  CT alone cannot exclude endometriti
s.

 

Subtle areas of diminished enhancement within the renal parenchyma. A minimal degree of pyelonephriti
s cannot be excluded but would need supporting clinical presentation and supporting UA abnormalities.


 

No acute GI abnormality.

## 2020-02-22 NOTE — XMS REPORT
Encounter Summary

 Created on:2019



Patient:Urvashi Witt

Sex:Female

:1995

External Reference #:[id number]





Demographics







 Address  [address]



   [place] [address]

 

 Home Phone  [phone]

 

 Preferred Language  English

 

 Marital Status  Never 

 

 Taoism Affiliation  Unknown

 

 Race  White

 

 Ethnic Group   or /Albanian









Author







Care Team Providers







 Name  Role  Phone

 

 Frankie Low  Ob/Gyn  [phone]

 

 Marilu Diaz  Nurse Practitioner  [phone]









Reason for Visit







 3 hr gtt







Instructions







         1. Abnormal glucose tolerance test during pregnancy - baby not yet 
delivered



 

 

              glucose tolerance test, 3-hour



Discussion Note: None recorded.Patient educational handouts: No information 
available.



Plan of Care







 Reminders      Provider



       

 

      Appointments            OB Sono         Frankie Low,



     2019  MD



     9:30AM  

 

                  OB Sono         Sonogram



     2019  



     9:30AM  

 

      Lab            Glucose         In-House Results



   Tolerance Test, 3-Hour  2019  



       

 

      Referral            None              



   recorded.    

 

      Procedures            None              



   recorded.    

 

      Surgeries            None              



   recorded.    

 

      Imaging            None              



   recorded.    







Medications







 Name  Start Date    



       









 Bactrim  mg-160 mg tablet  



  Take 1 tablet twice a day by oral route for 5 days.  

 

 butalbital-acetaminophen-caffeine 50 mg-325 mg-40 mg tablet  



  TAKE ONE (1) TABLET(S) EVERY 4 HOURS BY MOUTH DAILY AS NEEDED.  

 

 clindamycin HCl 300 mg capsule  



  Take 1 capsule every 8 hours by oral route.  

 

 ferrous gluconate 240 mg (27 mg iron) tablet  



  Take 1 tablet every day by oral route.  

 

 Macrobid 100 mg capsule  



  Take 1 capsule twice a day by oral route for 5 days.  

 

 nitrofurantoin macrocrystal 50 mg capsule  



  Take 1 capsule every day by oral route in the evening.  

 

 Reglan 10 mg tablet  



  Take 1 tablet 4 times a day by oral route.  







Medications Administered

 None recorded.



Vitals







 Height  Weight  BMI  Blood Pressure

 

  5 ft 1 in   143.8 lbs   27.2 kg/m2   114/82 mm[Hg]







Results







 Lab Results

 

 









 Date  Name  Specimen  Result  Interpretation  Description  Value  Range  
Status  Address  









 2019  Glucose  Blood       Results  passed      In-House



   Tolerance  capillary              Results:



   Test, 3-Hour                For



                   Internal



                   Use Only



                   

 

 2019  Glucose         Results  149      In-House



   Tolerance                Results:



   Test, 1-Hour                For



                   Internal



                   Use Only



                   

 

 2019  Urinalysis,         Leukocytes  Negative      In-House



   Dipstick                Results:



                   For



                   Internal



                   Use Only



                   

 

            Nitrite  negative      In-House



                   Results:



                   For



                   Internal



                   Use Only



                   

 

            Urobilinogen  .2      In-House



                   Results:



                   For



                   Internal



                   Use Only



                   

 

            Protein  30      In-House



                   Results:



                   For



                   Internal



                   Use Only



                   

 

            Ph  7.0      In-House



                   Results:



                   For



                   Internal



                   Use Only



                   

 

            Blood  Negative      In-House



                   Results:



                   For



                   Internal



                   Use Only



                   

 

            Specific  1.020      In-House



           Gravity        Results:



                   For



                   Internal



                   Use Only



                   

 

            Ketone  Negative      In-House



                   Results:



                   For



                   Internal



                   Use Only



                   

 

            Bilirubin  Negative      In-House



                   Results:



                   For



                   Internal



                   Use Only



                   

 

            Glucose  Negative      In-House



                   Results:



                   For



                   Internal



                   Use Only



                   

 

            Appearance  Clear      In-House



                   Results:



                   For



                   Internal



                   Use Only



                   

 

            Color  Yellow      In-House



                   Results:



                   For



                   Internal



                   Use Only



                   

 

   US,        No observation        In-House



   Obstetric,        recorded.        Results:



   3Rd                For



   Trimester                Internal



                   Use Only



                   







Allergies







 Code  Code System  Name  Reaction  Severity  Status  Onset



             









 NKDA        







Problems







 Name  Status  Onset Date  Source  



         









 Pregnant  Active  07/10/2019  

 

 Acute Cystitis in Pregnancy, Antepartum  Active  2019  

 

 Acute Cervicitis  Active  2019  

 

 History of Acute Renal Failure  Active  2019  

 

 Generalized Headache  Active  2019  

 

 Recurrent Urinary Tract Infection  Active    







Procedures







 Date  Name  Performed by  



       









 2019  US, Obstetric, 3Rd Trimester  In-House Results



     For Internal Use Only



     [id number]







Vaccine List

None recorded.



Social History







 Tobacco Smoking Status  Never Smoker  







Past Encounters







 2019



 Abnormal Glucose Tolerance Test During Pregnancy - Baby Not Yet Delivered



 Frankie Low MD: [address] [phone], 
Ph. 

 

 2019



  Screening; Routine  Care; Recurrent Urinary Tract Infection
; History of Acute Renal Failure; Abnormal Glucose Tolerance Test During 
Pregnancy - Baby Not Yet Delivered



 Frankie Low MD: [address] [phone], 
Ph. 







History of Present Illness

None recorded.



Review of Systems

None recorded.



Physical Exam

None recorded.

## 2020-02-22 NOTE — XMS REPORT
Encounter Summary

 Created on:2020



Patient:Urvashi Witt

Sex:Female

:1995

External Reference #[id number]





Demographics







 Address  [address] [place] [address] [address]



   [place] [address]

 

 Home Phone  [phone]

 

 Preferred Language  English

 

 Marital Status  Never 

 

 Orthodoxy Affiliation  Unknown

 

 Race  White

 

 Ethnic Group   or /Syriac









Author







Care Team Providers







 Name  Role  Phone

 

 Frankie Low  Ob/Gyn  [phone]

 

 Marilu Diaz  Nurse Practitioner  [phone]









Reason for Visit







 ob 38 week visit







Instructions







         1. Routine  care

 

              Vitafol-One 29 mg iron-1 mg-200 mg capsule

 

         2. Reduced fetal movement

 

              urinalysis, dipstick

 

              non-stress test

 

         3. Candidiasis of vagina

 

              Diflucan 200 mg tablet

 

              wet mount, vaginal









 Discussion Note

 

      Discussed risks, benefits, and alternatives of induction of labor, 
including



 prolonged labor, infection, fetal dsitress, failed induction, and increased 
risk of



  section. Details of use of Pitocin explained. Induction scheduled for



 20. At least 15 min. of face to face time with the patient, >50% spent on



 counseling.



Patient educational handouts: No information available.



Plan of Care







 Reminders      Provider



       

 

      Appointments            Post Partum         Frankie Low,



   Visit  2020  MD



     3:00PM  

 

      Lab            Urinalysis,         In-House Results



   Dipstick  2020  



       

 

                  Wet Mount,         In-House Results



   Vaginal  2020  



       

 

      Referral            None              



   recorded.    

 

      Procedures            None              



   recorded.    

 

      Surgeries            None              



   recorded.    

 

      Imaging            Non-stress         In-House Results



   Test  2020  



       







Medications







 Name  Start Date    



       









 Bactrim  mg-160 mg tablet  



  Take 1 tablet twice a day by oral route for 5 days.  

 

 butalbital-acetaminophen-caffeine 50 mg-325 mg-40 mg tablet  



  TAKE ONE (1) TABLET(S) EVERY 4 HOURS BY MOUTH DAILY AS NEEDED.  

 

 Diflucan 200 mg tablet  



  Take 1 tablet by oral route for 3 days.  

 

 ferrous gluconate 240 mg (27 mg iron) tablet  



  Take 1 tablet every day by oral route.  

 

 nitrofurantoin macrocrystal 50 mg capsule  



  Take 1 capsule every day by oral route in the evening.  

 

 nitrofurantoin monohydrate/macrocrystals 100 mg capsule  



  Take 1 capsule every day by oral route.  

 

 oseltamivir 75 mg capsule  



  Take 1 capsule twice a day by oral route for 5 days.  

 

 Reglan 10 mg tablet  



  Take 1 tablet 4 times a day by oral route.  

 

 Vitafol-One 29 mg iron-1 mg-200 mg capsule  



  Take 1 capsule every day by oral route at bedtime.  







Medications Administered

 None recorded.



Vitals







 Height  Weight  BMI  Blood Pressure

 

  5 ft 1 in   150.5 lbs   28.4 kg/m2   127/67 mm[Hg]







Results







 Lab Results

 

 









 Date  Name  Specimen  Result  Interpretation  Description  Value  Range  
Status  Address  









 2020  Urinalysis,         Leukocytes  Small      In-House



   Dipstick                Results:



                   For



                   Internal



                   Use Only



                   

 

            Nitrite  negative      In-House



                   Results:



                   For



                   Internal



                   Use Only



                   

 

            Urobilinogen  .2      In-House



                   Results:



                   For



                   Internal



                   Use Only



                   

 

            Protein  Trace      In-House



                   Results:



                   For



                   Internal



                   Use Only



                   

 

            Ph  7.0      In-House



                   Results:



                   For



                   Internal



                   Use Only



                   

 

            Blood  Negative      In-House



                   Results:



                   For



                   Internal



                   Use Only



                   

 

            Specific  1.020      In-House



           Gravity        Results:



                   For



                   Internal



                   Use Only



                   

 

            Ketone  Negative      In-House



                   Results:



                   For



                   Internal



                   Use Only



                   

 

            Bilirubin  Negative      In-House



                   Results:



                   For



                   Internal



                   Use Only



                   

 

            Glucose  Negative      In-House



                   Results:



                   For



                   Internal



                   Use Only



                   

 

            Appearance  Cloudy      In-House



                   Results:



                   For



                   Internal



                   Use Only



                   

 

            Color  Yellow      In-House



                   Results:



                   For



                   Internal



                   Use Only



                   

 

 2020  Urinalysis,         Leukocytes  Small      In-House



   Dipstick                Results:



                   For



                   Internal



                   Use Only



                   

 

            Nitrite  negative      In-House



                   Results:



                   For



                   Internal



                   Use Only



                   

 

            Urobilinogen  .2      In-House



                   Results:



                   For



                   Internal



                   Use Only



                   

 

            Protein  Trace      In-House



                   Results:



                   For



                   Internal



                   Use Only



                   

 

            Ph  7.0      In-House



                   Results:



                   For



                   Internal



                   Use Only



                   

 

            Blood  Negative      In-House



                   Results:



                   For



                   Internal



                   Use Only



                   

 

            Specific  1.025      In-House



           Gravity        Results:



                   For



                   Internal



                   Use Only



                   

 

            Ketone  Negative      In-House



                   Results:



                   For



                   Internal



                   Use Only



                   

 

            Bilirubin  Negative      In-House



                   Results:



                   For



                   Internal



                   Use Only



                   

 

            Glucose  Negative      In-House



                   Results:



                   For



                   Internal



                   Use Only



                   

 

            Appearance  Clear      In-House



                   Results:



                   For



                   Internal



                   Use Only



                   

 

            Color  Yellow      In-House



                   Results:



                   For



                   Internal



                   Use Only



                   

 

 2020  Streptococcus  Genital      No observation        [facility]



   Group B,  vaginal      recorded.        PSC: 7207



   Culture,                [place]



   Unspecified                ,



   Specimen                [place]



                   

 

 2020  Culture, Urine        No observation        [facility]



           recorded.        [facility]: [address]



                   [address],



                   [place]



                   

 

 2020  Urinalysis,         Leukocytes  Trace      In-House



   Dipstick                Results:



                   For



                   Internal



                   Use Only



                   

 

            Nitrite  negative      In-House



                   Results:



                   For



                   Internal



                   Use Only



                   

 

            Urobilinogen  .2      In-House



                   Results:



                   For



                   Internal



                   Use Only



                   

 

            Protein  Trace      In-House



                   Results:



                   For



                   Internal



                   Use Only



                   

 

            Ph  7.0      In-House



                   Results:



                   For



                   Internal



                   Use Only



                   

 

            Blood  Negative      In-House



                   Results:



                   For



                   Internal



                   Use Only



                   

 

            Specific  1.020      In-House



           Gravity        Results:



                   For



                   Internal



                   Use Only



                   

 

            Ketone  Negative      In-House



                   Results:



                   For



                   Internal



                   Use Only



                   

 

            Bilirubin  Negative      In-House



                   Results:



                   For



                   Internal



                   Use Only



                   

 

            Glucose  Negative      In-House



                   Results:



                   For



                   Internal



                   Use Only



                   

 

            Appearance  Clear      In-House



                   Results:



                   For



                   Internal



                   Use Only



                   

 

            Color  Yellow      In-House



                   Results:



                   For



                   Internal



                   Use Only



                   

 

   Wet Mount,         Clue Cells  negative      In-House



   Vaginal                Results:



                   For



                   Internal



                   Use Only



                   

 

            Wbcs  positive      In-House



                   Results:



                   For



                   Internal



                   Use Only



                   

 

            Trichomonads  negative      In-House



                   Results:



                   For



                   Internal



                   Use Only



                   

 

            Epithelial  normal      In-House



           Cells        Results:



                   For



                   Internal



                   Use Only



                   

 

            Rbcs  negative      In-House



                   Results:



                   For



                   Internal



                   Use Only



                   

 

   Non-stress Test         Reactive  Yes      In-House



                   Results:



                   For



                   Internal



                   Use Only



                   

 

            Contractions  No      In-House



                   Results:



                   For



                   Internal



                   Use Only



                   

 

   Urinalysis,         Leukocytes  Trace      In-House



   Dipstick                Results:



                   For



                   Internal



                   Use Only



                   

 

            Nitrite  negative      In-House



                   Results:



                   For



                   Internal



                   Use Only



                   

 

            Urobilinogen  .2      In-House



                   Results:



                   For



                   Internal



                   Use Only



                   

 

            Protein  30      In-House



                   Results:



                   For



                   Internal



                   Use Only



                   

 

            Ph  7.0      In-House



                   Results:



                   For



                   Internal



                   Use Only



                   

 

            Blood  Non-Hemoly      In-House



             zed: Trace      Results:



                   For



                   Internal



                   Use Only



                   

 

            Specific  1.020      In-House



           Gravity        Results:



                   For



                   Internal



                   Use Only



                   

 

            Ketone  Negative      In-House



                   Results:



                   For



                   Internal



                   Use Only



                   

 

            Bilirubin  Negative      In-House



                   Results:



                   For



                   Internal



                   Use Only



                   

 

            Glucose  Negative      In-House



                   Results:



                   For



                   Internal



                   Use Only



                   

 

            Appearance  Clear      In-House



                   Results:



                   For



                   Internal



                   Use Only



                   

 

            Color  Dark      In-House



             Yellow      Results:



                   For



                   Internal



                   Use Only



                   

 

   US, Obstetric,        No observation        In-House



   Limited        recorded.        Results:



                   For



                   Internal



                   Use Only



                   







Allergies







 Code  Code System  Name  Reaction  Severity  Status  Onset



             









 NKDA        







Problems







 Name  Status  Onset Date  Source  



         









 Pregnant  Active  07/10/2019  

 

 Acute Cystitis in Pregnancy, Antepartum  Active  2019  

 

 Acute Cervicitis  Active  2019  

 

 History of Acute Renal Failure  Active  2019  

 

 Generalized Headache  Active  2019  

 

 Recurrent Urinary Tract Infection  Active    







Procedures







 Date  Name  Performed by  



       









 2020  US, Obstetric, Limited  In-House Results



     For Internal Use Only



     16994

 

 2020  Non-stress Test  In-House Results



     For Internal Use Only



     06134







Vaccine List

None recorded.



Social History







 Tobacco Smoking Status  Never Smoker  







Past Encounters







 2020



 Routine  Care; Reduced Fetal Movement; Candidiasis of Vagina



 Frankie Low MD: [address] [place] [phone], 
Ph. 

 

 2020



 Routine  Care



 Frankie Low MD: [address] [place] [phone], 
Ph. 

 

 2020



 Routine  Care; Exposure to Influenzavirus; Recurrent Urinary Tract 
Infection; History of Acute Renal Failure



 Frankie Low MD: [address] [place] [phone], 
Ph. 

 

 2020



  Screening; Recurrent Urinary Tract Infection; History of Acute Renal 
Failure



 Frankie Low MD: [address] [place] [phone], 
Ph. 







History of Present Illness

None recorded.



Review of Systems

None recorded.



Physical Exam

None recorded.

## 2020-02-22 NOTE — XMS REPORT
Encounter Summary

 Created on:2020



Patient:Urvashi Witt

Sex:Female

:1995

External Reference #[id number]





Demographics







 Address  [address]



   [place] [address]

 

 Home Phone  [phone]

 

 Preferred Language  English

 

 Marital Status  Never 

 

 Cheondoism Affiliation  Unknown

 

 Race  White

 

 Ethnic Group   or /Gambian









Author







Care Team Providers







 Name  Role  Phone

 

 Frankie Devante  Ob/Gyn  [phone]

 

 Marilu Diaz  Nurse Practitioner  [phone]









Reason for Visit







 ob 34 week visit







Instructions







         1.  screening

 

              streptococcus group B, culture, unspecified specimen - PCR for 
group B



 strep, Ob - vag/rectal

 

         2. Recurrent urinary tract infection

 

              urinalysis, dipstick

 

              culture, urine

 

         3. History of acute renal failure









 Discussion Note

 

      Discussed GBS and reason for testing. Discussed fetal movement and PTL



 symptoms. Reminded patient to take iron and PNV, stay well hydrated, and call 
if



 fetal movement is decreased, or if regular contractions, bleeding, or symptoms 
of



 ruptured membranes occur.



Patient educational handouts: No information available.



Plan of Care







 Reminders      Provider



       

 

      Appointments            OB [place]       on or around       Frankie Devante,



     2020  MD

 

      Lab            Urinalysis,       2020       In-House



   Dipstick    Results

 

                  Streptococcus       2020       [place]



   Group B, Culture,    [facility]



   Unspecified Specimen    Center (Lab)



       

 

                  Culture, Urine       2020       [facility] (Labs) (Xray)



       

 

      Referral            None recorded.              



       

 

      Procedures            None recorded.              



       

 

      Surgeries            None recorded.              



       

 

      Imaging            None recorded.              



       







Medications







 Name  Start Date    



       









 Bactrim  mg-160 mg tablet  



  Take 1 tablet twice a day by oral route for 5 days.  

 

 butalbital-acetaminophen-caffeine 50 mg-325 mg-40 mg tablet  



  TAKE ONE (1) TABLET(S) EVERY 4 HOURS BY MOUTH DAILY AS NEEDED.  

 

 clindamycin HCl 300 mg capsule  



  Take 1 capsule every 8 hours by oral route.  

 

 ferrous gluconate 240 mg (27 mg iron) tablet  



  Take 1 tablet every day by oral route.  

 

 Macrobid 100 mg capsule  



  Take 1 capsule twice a day by oral route for 5 days.  

 

 nitrofurantoin macrocrystal 50 mg capsule  



  Take 1 capsule every day by oral route in the evening.  

 

 Reglan 10 mg tablet  



  Take 1 tablet 4 times a day by oral route.  







Medications Administered

 None recorded.



Vitals







 Height  Weight  BMI  Blood Pressure

 

  5 ft 1 in   148 lbs   28 kg/m2   106/62 mm[Hg]







Results







 Lab Results

 

 









 Date  Name  Specimen  Result  Interpretation  Description  Value  Range  
Status  Address  









 2020  Urinalysis,         Leukocytes  Trace      In-House



   Dipstick                Results:



                   For



                   Internal



                   Use Only



                   

 

            Nitrite  negative      In-House



                   Results:



                   For



                   Internal



                   Use Only



                   

 

            Urobilinogen  .2      In-House



                   Results:



                   For



                   Internal



                   Use Only



                   

 

            Protein  Trace      In-House



                   Results:



                   For



                   Internal



                   Use Only



                   

 

            Ph  7.0      In-House



                   Results:



                   For



                   Internal



                   Use Only



                   

 

            Blood  Negative      In-House



                   Results:



                   For



                   Internal



                   Use Only



                   

 

            Specific  1.020      In-House



           Gravity        Results:



                   For



                   Internal



                   Use Only



                   

 

            Ketone  Negative      In-House



                   Results:



                   For



                   Internal



                   Use Only



                   

 

            Bilirubin  Negative      In-House



                   Results:



                   For



                   Internal



                   Use Only



                   

 

            Glucose  Negative      In-House



                   Results:



                   For



                   Internal



                   Use Only



                   

 

            Appearance  Clear      In-House



                   Results:



                   For



                   Internal



                   Use Only



                   

 

            Color  Yellow      In-House



                   Results:



                   For



                   Internal



                   Use Only



                   

 

 2019  Urinalysis,         Leukocytes  Small      In-House



   Dipstick                Results:



                   For



                   Internal



                   Use Only



                   

 

            Nitrite  negative      In-House



                   Results:



                   For



                   Internal



                   Use Only



                   

 

            Urobilinogen  .2      In-House



                   Results:



                   For



                   Internal



                   Use Only



                   

 

            Protein  30      In-House



                   Results:



                   For



                   Internal



                   Use Only



                   

 

            Ph  7.0      In-House



                   Results:



                   For



                   Internal



                   Use Only



                   

 

            Blood  Non-Hemolyz      In-House



             ed: Trace      Results:



                   For



                   Internal



                   Use Only



                   

 

            Specific  1.025      In-House



           Gravity        Results:



                   For



                   Internal



                   Use Only



                   

 

            Ketone  Negative      In-House



                   Results:



                   For



                   Internal



                   Use Only



                   

 

            Bilirubin  Negative      In-House



                   Results:



                   For



                   Internal



                   Use Only



                   

 

            Glucose  Negative      In-House



                   Results:



                   For



                   Internal



                   Use Only



                   

 

            Appearance  Clear      In-House



                   Results:



                   For



                   Internal



                   Use Only



                   

 

            Color  Yellow      In-House



                   Results:



                   For



                   Internal



                   Use Only



                   

 

   US, Obstetric,        No observation        In-House



   Limited        recorded.        Results:



                   For



                   Internal



                   Use Only



                   







Allergies







 Code  Code System  Name  Reaction  Severity  Status  Onset



             









 NKDA        







Problems







 Name  Status  Onset Date  Source  



         









 Pregnant  Active  07/10/2019  

 

 Acute Cystitis in Pregnancy, Antepartum  Active  2019  

 

 Acute Cervicitis  Active  2019  

 

 History of Acute Renal Failure  Active  2019  

 

 Generalized Headache  Active  2019  

 

 Recurrent Urinary Tract Infection  Active    







Procedures







 Date  Name  Performed by  



       









 2019  US, Obstetric, Limited  In-House Results



     For Internal Use Only



     00231







Vaccine List

None recorded.



Social History







 Tobacco Smoking Status  Never Smoker  







Past Encounters







 2020



  Screening; Recurrent Urinary Tract Infection; History of Acute Renal 
Failure



 Frankie Low MD: [address] [phone], 
Ph. 

 

 2019



 Routine  Care; Recurrent Urinary Tract Infection



 Frankie Low MD: [address] [place] [address], [place] [address], 
Ph. 







History of Present Illness

None recorded.



Review of Systems

None recorded.



Physical Exam

None recorded.

## 2020-03-18 ENCOUNTER — HOSPITAL ENCOUNTER (EMERGENCY)
Dept: HOSPITAL 97 - ER | Age: 25
Discharge: HOME | End: 2020-03-18
Payer: COMMERCIAL

## 2020-03-18 VITALS — TEMPERATURE: 99.2 F | OXYGEN SATURATION: 99 % | SYSTOLIC BLOOD PRESSURE: 109 MMHG | DIASTOLIC BLOOD PRESSURE: 64 MMHG

## 2020-03-18 DIAGNOSIS — N75.1: Primary | ICD-10-CM

## 2020-03-18 DIAGNOSIS — J02.0: ICD-10-CM

## 2020-03-18 PROCEDURE — 87081 CULTURE SCREEN ONLY: CPT

## 2020-03-18 PROCEDURE — 0U9L0ZZ DRAINAGE OF VESTIBULAR GLAND, OPEN APPROACH: ICD-10-PCS

## 2020-03-18 PROCEDURE — 99284 EMERGENCY DEPT VISIT MOD MDM: CPT

## 2020-03-18 PROCEDURE — 87804 INFLUENZA ASSAY W/OPTIC: CPT

## 2020-03-18 NOTE — EDPHYS
Physician Documentation                                                                           

 [facility]                                                                 

Name: Urvashi Witt                                                                                  

Age: 24 yrs                                                                                       

Sex: Female                                                                                       

: 1995                                                                                   

MRN: [id number]                                                                                   

Arrival Date: 2020                                                                          

Time: 11:51                                                                                       

Account#: [id number]                                                                            

Bed 6                                                                                             

Private MD:                                                                                       

ED Physician Renny Champion                                                                       

HPI:                                                                                              

                                                                                             

14:34 This 24 yrs old  Female presents to ER via Ambulatory with complaints of Fever, kb  

      Sore Throat.                                                                                

14:34 The patient presents with sore throat. The patient describes throat pain as constant.   kb  

      Onset: The symptoms/episode began/occurred 4 day(s) ago. Severity of symptoms: At their     

      worst the symptoms were moderate, in the emergency department the symptoms are              

      unchanged. Modifying factors: The symptoms are alleviated by nothing, the symptoms are      

      aggravated by swallowing, Patient's oral intake status: good unaware of sick contact.       

      Associated signs and symptoms: Pertinent positives: fever, Sore throat Pt reports           

      vaginal pain started on , sore throat started on Monday and fever started today.      

      The patient has not experienced similar symptoms in the past. The patient has not           

      recently seen a physician.                                                                  

                                                                                                  

OB/GYN:                                                                                           

13:00 LMP N/A - Recent birth                                                                  jl7 

                                                                                                  

Historical:                                                                                       

- Allergies:                                                                                      

13:00 No Known Allergies;                                                                     jl7 

                                                                                                  

- Immunization history:: Adult Immunizations unknown.                                             

- Social history:: Smoking status: Patient denies any tobacco usage or history of.                

                                                                                                  

                                                                                                  

ROS:                                                                                              

14:34 Cardiovascular: Negative for chest pain, palpitations, and edema, Respiratory: Negative kb  

      for shortness of breath, cough, wheezing, and pleuritic chest pain, Abdomen/GI:             

      Negative for abdominal pain, nausea, vomiting, diarrhea, and constipation, Back:            

      Negative for injury and pain, MS/Extremity: Negative for injury and deformity, Skin:        

      Negative for injury, rash, and discoloration, Neuro: Negative for headache, weakness,       

      numbness, tingling, and seizure.                                                            

14:34 Constitutional: Positive for fever.                                                         

14:34 ENT: Positive for sore throat.                                                              

14:48 : Positive for vaginal pain.                                                          kb  

                                                                                                  

Exam:                                                                                             

14:41 Constitutional:  This is a well developed, well nourished patient who is awake, alert,  kb  

      and in no acute distress. Head/Face:  Normocephalic, atraumatic. ENT:  Nares patent. No     

      nasal discharge, no septal abnormalities noted.  Tympanic membranes are normal and          

      external auditory canals are clear.  Oropharynx with no redness, swelling, or masses,       

      exudates, or evidence of obstruction, uvula midline.  Mucous membranes moist. Neck:         

      Trachea midline, no thyromegaly or masses palpated, and no cervical lymphadenopathy.        

      Supple, full range of motion without nuchal rigidity, or vertebral point tenderness.        

      No Meningismus. Chest/axilla:  Normal chest wall appearance and motion.  Nontender with     

      no deformity.  No lesions are appreciated. Cardiovascular:  Regular rate and rhythm         

      with a normal S1 and S2.  No gallops, murmurs, or rubs.  Normal PMI, no JVD.  No pulse      

      deficits. Respiratory:  Lungs have equal breath sounds bilaterally, clear to                

      auscultation and percussion.  No rales, rhonchi or wheezes noted.  No increased work of     

      breathing, no retractions or nasal flaring. Abdomen/GI:  Soft, non-tender, with normal      

      bowel sounds.  No distension or tympany.  No guarding or rebound.  No evidence of           

      tenderness throughout. Back:  No spinal tenderness.  No costovertebral tenderness.          

      Full range of motion. Skin:  Warm, dry with normal turgor.  Normal color with no            

      rashes, no lesions, and no evidence of cellulitis. MS/ Extremity:  Pulses equal, no         

      cyanosis.  Neurovascular intact.  Full, normal range of motion. Neuro:  Awake and           

      alert, GCS 15, oriented to person, place, time, and situation.  Cranial nerves II-XII       

      grossly intact.  Motor strength 5/5 in all extremities.  Sensory grossly intact.            

      Cerebellar exam normal.  Normal gait.                                                       

14:41 : Pelvic Exam: External exam: Bartholin's cyst present, discharge, white, the             

      family/significant other was present for the exam.                                          

                                                                                                  

Vital Signs:                                                                                      

12:56  / 70; Pulse 102; Resp 22; Temp 100; Pulse Ox 100% ; Weight 56.25 kg; Pain 10/10; jl7 

14:15  / 64; Pulse 89; Resp 16; Temp 99.2; Pulse Ox 99% ;                               hb  

                                                                                                  

Procedures:                                                                                       

14:41 I \T\ D: Incision and drainage was performed for an abscess of the right Bartholin's      kb

      gland. Prepped with Betadine, Anesthetized with 2 ml's 1% Lidocaine. Incised with #11       

      blade. Drained moderate amount purulent fluid. the patient tolerated the procedure well.    

                                                                                                  

MDM:                                                                                              

12:54 Patient medically screened.                                                             kb  

14:40 Data reviewed: vital signs, nurses notes. Data interpreted: Pulse oximetry: on room air kb  

      is 99 %. Interpretation: normal. Counseling: I had a detailed discussion with the           

      patient and/or guardian regarding: the historical points, exam findings, and any            

      diagnostic results supporting the discharge/admit diagnosis, lab results, the need for      

      outpatient follow up, a family practitioner, an OB/Gyne specialist, to return to the        

      emergency department if symptoms worsen or persist or if there are any questions or         

      concerns that arise at home.                                                                

                                                                                                  

                                                                                             

13:05 Order name: Flu; Complete Time: 14:07                                                   kb  

                                                                                             

13:05 Order name: Strep; Complete Time: 13:54                                                 kb  

                                                                                             

13:05 Order name: I\T\D Setup; Complete Time: 13:14                                             kb

                                                                                                  

Administered Medications:                                                                         

13:13 Drug: Norco 10 mg-325 mg 1 tabs Route: PO;                                              jl7 

15:03 Follow up: Response: No adverse reaction                                                sv  

14:00 Drug: Lidocaine (1 %) 1 vials {Note: given to Gosia MUÑOZ.} Volume: 5 ml; Route:         sv  

      Infiltration;                                                                               

                                                                                                  

                                                                                                  

Disposition:                                                                                      

16:28 Co-signature as Attending Physician, Renny Champion MD I agree with the assessment and   kdr 

      plan of care.                                                                               

                                                                                                  

Disposition:                                                                                      

20 14:27 Discharged to Home. Impression: Streptococcal pharyngitis, Abscess of              

  Bartholin's gland.                                                                              

- Condition is Stable.                                                                            

- Discharge Instructions: Strep Throat, Easy-to-Read, Bartholin Cyst or Abscess,                  

  Easy-to-Read.                                                                                   

- Prescriptions for Augmentin 875- 125 mg Oral Tablet - take 1 tablet by ORAL route               

  every 12 hours for 10 days; 20 tablet.                                                          

- Medication Reconciliation Form, Thank You Letter, Antibiotic Education, Prescription            

  Opioid Use form.                                                                                

- Follow up: Emergency Department; When: As needed; Reason: Worsening of condition.               

  Follow up: Private Physician; When: 2 - 3 days; Reason: Recheck today's complaints,             

  Continuance of care, Re-evaluation by your physician.                                           

                                                                                                  

                                                                                                  

                                                                                                  

Signatures:                                                                                       

Dispatcher [employer]                           EDGosia Caceres FNP-C FNP-Ckb Verde, Stephanie, RN RN sv Rittger, Kevin, MD MD kdr Leal, Jahala, RN                        RN   jl7                                                  

                                                                                                  

Corrections: (The following items were deleted from the chart)                                    

15:08 14:27 2020 14:27 Discharged to Home. Impression: Streptococcal pharyngitis;       sv  

      Abscess of Bartholin's gland. Condition is Stable. Forms are Medication Reconciliation      

      Form, Thank You Letter, Antibiotic Education, Prescription Opioid Use. Follow up:           

      Emergency Department; When: As needed; Reason: Worsening of condition. Follow up:           

      Private Physician; When: 2 - 3 days; Reason: Recheck today's complaints, Continuance of     

      care, Re-evaluation by your physician. kb                                                   

                                                                                                  

**************************************************************************************************

## 2020-03-18 NOTE — ER
Nurse's Notes                                                                                     

 [facility]                                                                 

Name: Urvashi Witt                                                                                  

Age: 24 yrs                                                                                       

Sex: Female                                                                                       

: 1995                                                                                   

MRN: [id number]                                                                                   

Arrival Date: 2020                                                                          

Time: 11:51                                                                                       

Account#: [id number]                                                                            

Bed 6                                                                                             

Private MD:                                                                                       

Diagnosis: Streptococcal pharyngitis;Abscess of Bartholin's gland                                 

                                                                                                  

Presentation:                                                                                     

                                                                                             

12:56 Chief complaint: Patient states: Recent birth 2018, c/o vaginal pain and irritation jl7 

      since ; fever and sore throat started today. Coronavirus screen: The patient has      

      NOT traveled to a country currently being monitored by the [facility] within the last 14 days.     

      Proceed with normal triage procedures. The patient has NOT had contact with any known       

      and/or suspected case of coronavirus. Proceed with normal triage procedures. Ebola          

      Screen: No symptoms or risks identified at this time. Initial Sepsis Screen: Does the       

      patient meet any 2 criteria? RR > 20 per min. HR > 90 bpm. Yes Does the patient have a      

      suspected source of infection? No. Patient's initial sepsis screen is negative. Risk        

      Assessment: Do you want to hurt yourself or someone else? Patient reports no desire to      

      harm self or others. Onset of symptoms was March 15, 2020.                                  

12:56 Method Of Arrival: Ambulatory                                                           [facility] 

12:56 Acuity: YAMILET 3                                                                           jl7 

                                                                                                  

OB/GYN:                                                                                           

13:00 LMP N/A - Recent birth                                                                  jl7 

                                                                                                  

Historical:                                                                                       

- Allergies:                                                                                      

13:00 No Known Allergies;                                                                     jl7 

                                                                                                  

- Immunization history:: Adult Immunizations unknown.                                             

- Social history:: Smoking status: Patient denies any tobacco usage or history of.                

                                                                                                  

                                                                                                  

Screenin:00 Abuse screen: Denies threats or abuse. Denies injuries from another. Nutritional        hb  

      screening: No deficits noted. Tuberculosis screening: No symptoms or risk factors           

      identified. Fall Risk None identified.                                                      

                                                                                                  

Assessment:                                                                                       

13:30 General: Appears in no apparent distress. uncomfortable, Behavior is calm, cooperative, sv  

      appropriate for age. Pain: Complains of pain in pelvis and sore throat. Neuro: Level of     

      Consciousness is awake, alert, obeys commands, Oriented to person, place, time,             

      situation, Gait is steady. Respiratory: Airway is patent Respiratory effort is even,        

      unlabored, Respiratory pattern is regular, symmetrical. : Reports vaginal itching.        

      EENT: Reports pain in left aspect of posterior pharynx and right aspect of posterior        

      pharynx. Derm: Skin is pink, warm \T\ dry.                                                  

14:30 Reassessment: Patient appears in no apparent distress at this time. Patient and/or      hb  

      family updated on plan of care and expected duration. Pain level reassessed. Patient is     

      alert, oriented x 3, equal unlabored respirations, skin warm/dry/pink.                      

                                                                                                  

Vital Signs:                                                                                      

12:56  / 70; Pulse 102; Resp 22; Temp 100; Pulse Ox 100% ; Weight 56.25 kg; Pain 10/10; jl7 

14:15  / 64; Pulse 89; Resp 16; Temp 99.2; Pulse Ox 99% ;                               hb  

                                                                                                  

ED Course:                                                                                        

11:51 Patient arrived in ED.                                                                  mr  

11:56 Gosia Ledbetter FNP-C is PHCP.                                                        kb  

11:56 Renny Champion MD is Attending Physician.                                              kb  

12:54 Gosia Ledbetter FNP-C is PHCP.                                                        kb  

12:54 Renny Champion MD is Attending Physician.                                              kb  

12:59 Triage completed.                                                                       jl7 

13:00 Arm band placed on right wrist.                                                         jl7 

13:35 Patient has correct armband on for positive identification.                             sv  

13:35 Strep Sent.                                                                             dh3 

13:35 Flu Sent.                                                                               dh3 

14:00 Assist provider with I \T\ D: of an abscess on Bartholin's gland Set up I\T\D tray.         sv

      Performed by Gosia PALMA. Patient did not have IV access during this              

      emergency room visit.                                                                       

14:14 Joceline Coe, RN is Primary Nurse.                                                   hb  

                                                                                                  

Administered Medications:                                                                         

13:13 Drug: Norco 10 mg-325 mg 1 tabs Route: PO;                                              jl7 

15:03 Follow up: Response: No adverse reaction                                                sv  

14:00 Drug: Lidocaine (1 %) 1 vials {Note: given to Gosia MUÑOZ.} Volume: 5 ml; Route:         sv  

      Infiltration;                                                                               

                                                                                                  

                                                                                                  

Outcome:                                                                                          

14:27 Discharge ordered by MD.                                                                kb  

15:04 Discharged to home ambulatory, with family.                                             sv  

15:04 Condition: stable                                                                           

15:04 Discharge instructions given to patient, Instructed on discharge instructions, follow       

      up and referral plans. medication usage, wound care, Demonstrated understanding of          

      instructions, follow-up care, medications, Prescriptions given X 1.                         

15:08 Patient left the ED.                                                                    sv  

                                                                                                  

Signatures:                                                                                       

Gosia Ledbetter FNP-C FNP-Ckb                                                   

Nava Garcia, RN                    RN   jesu Ruvalcaba, Adalgisa                                 Joceline Davenport, RN                     RN                                                      

Carmen Gillis RN                        RN   [facility]                                                  

Azul Aaron                              [facility]                                                  

                                                                                                  

**************************************************************************************************
